# Patient Record
Sex: MALE | Race: WHITE | NOT HISPANIC OR LATINO
[De-identification: names, ages, dates, MRNs, and addresses within clinical notes are randomized per-mention and may not be internally consistent; named-entity substitution may affect disease eponyms.]

---

## 2020-06-22 ENCOUNTER — APPOINTMENT (OUTPATIENT)
Dept: DISASTER EMERGENCY | Facility: CLINIC | Age: 65
End: 2020-06-22

## 2020-06-22 DIAGNOSIS — Z01.818 ENCOUNTER FOR OTHER PREPROCEDURAL EXAMINATION: ICD-10-CM

## 2020-06-23 LAB — SARS-COV-2 N GENE NPH QL NAA+PROBE: NOT DETECTED

## 2020-07-17 ENCOUNTER — APPOINTMENT (OUTPATIENT)
Dept: DISASTER EMERGENCY | Facility: CLINIC | Age: 65
End: 2020-07-17

## 2020-07-18 LAB — SARS-COV-2 N GENE NPH QL NAA+PROBE: NOT DETECTED

## 2022-04-12 NOTE — H&P ADULT - HISTORY OF PRESENT ILLNESS
67 y.o male with DM, HTN, HLD, h/o fall trauma, chronic atypical chest pain presented to cardiology with c/o recent substernal chest symptoms at rest, non-radiating or  67 y.o male, long history of smoking, with PMHx of DM, HTN, HLD, h/o fall trauma, significant claudication, chronic atypical chest pain presented to cardiology with c/o recent substernal chest symptoms at rest, non-radiating or associated with diaphoresis. In given multiple CAD risk factors, Pt scheduled for cardiac cath for further ischemic evaluation.   COVID-19 PCR (4/9/2022); NotDetect

## 2022-04-12 NOTE — H&P ADULT - ASSESSMENT
67 y.o male, long history of smoking, with PMHx of DM, HTN, HLD, h/o fall trauma, significant claudication, chronic atypical chest pain presented to cardiology with c/o recent substernal chest symptoms at rest, non-radiating or associated with diaphoresis. In given multiple CAD risk factors, Pt scheduled for cardiac cath for further ischemic evaluation.     ASA class:  Cr:  GFR:  Bleeding risk:    Mohran score:  67 y.o male, long history of smoking, with PMHx of DM, HTN, HLD, h/o fall trauma, significant claudication, chronic atypical chest pain presented to cardiology with c/o recent substernal chest symptoms at rest, non-radiating or associated with diaphoresis. In given multiple CAD risk factors, Pt scheduled for cardiac cath for further ischemic evaluation.     ASA class: II  Cr: 1.2  GFR: 66  Bleeding risk: 0.9  Mike score: 4 points 7.5%  NS 250cc bolus ordered and given

## 2022-04-12 NOTE — H&P ADULT - NSHPREVIEWOFSYSTEMS_GEN_ALL_CORE
REVIEW OF SYSTEMS:  CONSTITUTIONAL: No fever, weight loss, or fatigue  EYES: No eye pain, visual disturbances, or discharge  ENMT:  No difficulty hearing, tinnitus, vertigo; No sinus or throat pain  NECK: No pain or stiffness  BREASTS: No pain, masses, or nipple discharge  RESPIRATORY: No cough, wheezing, chills or hemoptysis; No shortness of breath  CARDIOVASCULAR: + chest pain, +diaphoresis   GASTROINTESTINAL: No abdominal or epigastric pain. No nausea, vomiting, or hematemesis; No diarrhea or constipation. No melena or hematochezia.  GENITOURINARY: No dysuria, frequency, hematuria, or incontinence  NEUROLOGICAL: No headaches, memory loss, loss of strength, numbness, or tremors  SKIN: No itching, burning, rashes, or lesions   ENDOCRINE: No heat or cold intolerance; No hair loss  MUSCULOSKELETAL: No joint pain or swelling; No muscle, back, or extremity pain  PSYCHIATRIC: No depression, anxiety, mood swings, or difficulty sleeping

## 2022-04-13 ENCOUNTER — OUTPATIENT (OUTPATIENT)
Dept: OUTPATIENT SERVICES | Facility: HOSPITAL | Age: 67
LOS: 1 days | Discharge: HOME | End: 2022-04-13
Payer: MEDICARE

## 2022-04-13 VITALS
RESPIRATION RATE: 16 BRPM | DIASTOLIC BLOOD PRESSURE: 73 MMHG | SYSTOLIC BLOOD PRESSURE: 108 MMHG | HEART RATE: 58 BPM | OXYGEN SATURATION: 99 %

## 2022-04-13 VITALS
DIASTOLIC BLOOD PRESSURE: 75 MMHG | HEIGHT: 70.4 IN | TEMPERATURE: 97 F | SYSTOLIC BLOOD PRESSURE: 178 MMHG | OXYGEN SATURATION: 100 % | RESPIRATION RATE: 16 BRPM | WEIGHT: 190.48 LBS | HEART RATE: 56 BPM

## 2022-04-13 DIAGNOSIS — R94.31 ABNORMAL ELECTROCARDIOGRAM [ECG] [EKG]: ICD-10-CM

## 2022-04-13 DIAGNOSIS — R07.9 CHEST PAIN, UNSPECIFIED: ICD-10-CM

## 2022-04-13 PROCEDURE — 93458 L HRT ARTERY/VENTRICLE ANGIO: CPT

## 2022-04-13 PROCEDURE — C1887: CPT

## 2022-04-13 PROCEDURE — C1894: CPT

## 2022-04-13 PROCEDURE — C1769: CPT

## 2022-04-13 RX ORDER — SITAGLIPTIN AND METFORMIN HYDROCHLORIDE 500; 50 MG/1; MG/1
1 TABLET, FILM COATED ORAL
Qty: 0 | Refills: 0 | DISCHARGE

## 2022-04-13 RX ORDER — SODIUM CHLORIDE 9 MG/ML
1000 INJECTION INTRAMUSCULAR; INTRAVENOUS; SUBCUTANEOUS
Refills: 0 | Status: DISCONTINUED | OUTPATIENT
Start: 2022-04-13 | End: 2022-04-13

## 2022-04-13 RX ORDER — SODIUM CHLORIDE 9 MG/ML
250 INJECTION INTRAMUSCULAR; INTRAVENOUS; SUBCUTANEOUS ONCE
Refills: 0 | Status: COMPLETED | OUTPATIENT
Start: 2022-04-13 | End: 2022-04-13

## 2022-04-13 RX ADMIN — SODIUM CHLORIDE 500 MILLILITER(S): 9 INJECTION INTRAMUSCULAR; INTRAVENOUS; SUBCUTANEOUS at 08:30

## 2022-04-13 NOTE — CHART NOTE - NSCHARTNOTEFT_GEN_A_CORE
Nurse Practitioner Progress note:   HPI:  67 y.o male, long history of smoking, with PMHx of DM, HTN, HLD, h/o fall trauma, significant claudication, chronic atypical chest pain presented to cardiology with c/o recent substernal chest symptoms at rest, non-radiating or associated with diaphoresis. In given multiple CAD risk factors, Pt scheduled for cardiac cath for further ischemic evaluation.     T(C): 36 (04-13-22 @ 07:09), Max: 36 (04-13-22 @ 07:09)  HR: 58 (04-13-22 @ 09:05) (56 - 59)  BP: 112/62 (04-13-22 @ 09:05) (112/62 - 178/75)  RR: 16 (04-13-22 @ 09:05) (16 - 16)  SpO2: 97% (04-13-22 @ 09:05) (96% - 100%)  Wt(kg): --    PHYSICAL EXAM:  Neurologic: Non-focal, AxOx3.  No neuro deficits  Vascular: Peripheral pulses palpable 2+ bilaterally  Procedure Site: Rt. radial band removed by RN manual pressure applied x20 minutes site benign soft no bleeding no hematoma +1 radial pulse pressure dressing with gauze applied to site no c/o numbness/tingling,< 3sec cap refill fingers/hand warm to touch     PROCEDURE RESULTS:  S/P C non-obstructive CAD        ASSESSMENT/PLAN: 	  -VS, labs, diet, activity as per post cath orders  -IV hydration NS 250cc /hr x4 hours   -Encourage PO fluids  -Continue current medications  -Pt. to be discharged home today  -Plan of care D/W pt. and MD  -Post cath instructions reviewed with pt., pt. verbalizes and understands instructions  -Follow-up with attending

## 2022-04-15 DIAGNOSIS — R94.39 ABNORMAL RESULT OF OTHER CARDIOVASCULAR FUNCTION STUDY: ICD-10-CM

## 2022-04-15 DIAGNOSIS — R07.9 CHEST PAIN, UNSPECIFIED: ICD-10-CM

## 2023-08-10 PROBLEM — E78.5 HYPERLIPIDEMIA, UNSPECIFIED: Chronic | Status: ACTIVE | Noted: 2022-04-12

## 2023-08-10 PROBLEM — I10 ESSENTIAL (PRIMARY) HYPERTENSION: Chronic | Status: ACTIVE | Noted: 2022-04-12

## 2023-08-17 ENCOUNTER — APPOINTMENT (OUTPATIENT)
Dept: ORTHOPEDIC SURGERY | Facility: CLINIC | Age: 68
End: 2023-08-17
Payer: MEDICARE

## 2023-08-17 VITALS — WEIGHT: 190 LBS | BODY MASS INDEX: 27.2 KG/M2 | HEIGHT: 70 IN

## 2023-08-17 DIAGNOSIS — Z00.00 ENCOUNTER FOR GENERAL ADULT MEDICAL EXAMINATION W/OUT ABNORMAL FINDINGS: ICD-10-CM

## 2023-08-17 DIAGNOSIS — E78.00 PURE HYPERCHOLESTEROLEMIA, UNSPECIFIED: ICD-10-CM

## 2023-08-17 DIAGNOSIS — E11.9 TYPE 2 DIABETES MELLITUS W/OUT COMPLICATIONS: ICD-10-CM

## 2023-08-17 DIAGNOSIS — Z86.69 PERSONAL HISTORY OF OTHER DISEASES OF THE NERVOUS SYSTEM AND SENSE ORGANS: ICD-10-CM

## 2023-08-17 DIAGNOSIS — M19.90 UNSPECIFIED OSTEOARTHRITIS, UNSPECIFIED SITE: ICD-10-CM

## 2023-08-17 DIAGNOSIS — I10 ESSENTIAL (PRIMARY) HYPERTENSION: ICD-10-CM

## 2023-08-17 PROCEDURE — 99204 OFFICE O/P NEW MOD 45 MIN: CPT

## 2023-08-17 PROCEDURE — 73630 X-RAY EXAM OF FOOT: CPT | Mod: 50

## 2023-08-17 NOTE — ASSESSMENT
[FreeTextEntry1] : MRI right hindfoot with and without contrast is recommended to evaluate the calcaneal lesion. Blood work ordered to evaluate for signs of a chronic infection. Patient is recommended to see a neurologist and be formally evaluated for neuropathy. The intermittent burning pain in both feet is likely related to neuropathy and not the right calcaneal lesion.

## 2023-08-17 NOTE — PHYSICAL EXAM
[NL (40)] : plantar flexion 40 degrees [NL 30)] : inversion 30 degrees [NL (20)] : eversion 20 degrees [5___] : [facility] 5[unfilled]/5 [2+] : posterior tibialis pulse: 2+ [Normal] : saphenous nerve sensation normal [] : patient ambulates without assistive device [Left] : left foot [There are no fractures, subluxations or dislocations. No significant abnormalities are seen] : There are no fractures, subluxations or dislocations. No significant abnormalities are seen [Right] : right foot [Weight -] : weightbearing [de-identified] : Lytic lesion in the anterior process of the calcaneus with heterogenous center.  Hammer toes. [TWNoteComboBox7] : dorsiflexion 15 degrees

## 2023-08-17 NOTE — HISTORY OF PRESENT ILLNESS
[8] : 8 [Sharp] : sharp [Shooting] : shooting [de-identified] : Pt is a 68-year-old male who presents today for evaluation of his feet.  He reports burning pain in both feet starting in early 2023.  Had XR taken at his podiatrist office which showed a lesion in the heel.  Symptoms are intermittent. Denies trauma/previous injury. Hx of neuropathy, but has never had an EMG/NCV or has been evaluated by a neurologist.  WB in work shoes. No treatment to date. [FreeTextEntry1] : b/l feet

## 2023-08-21 LAB
B BURGDOR AB SER-IMP: NEGATIVE
B BURGDOR IGG+IGM SER QL: 0.07 INDEX
CRP SERPL-MCNC: <3 MG/L
ERYTHROCYTE [SEDIMENTATION RATE] IN BLOOD BY WESTERGREN METHOD: 9 MM/HR

## 2023-08-25 ENCOUNTER — RESULT REVIEW (OUTPATIENT)
Age: 68
End: 2023-08-25

## 2023-08-31 ENCOUNTER — APPOINTMENT (OUTPATIENT)
Dept: ORTHOPEDIC SURGERY | Facility: CLINIC | Age: 68
End: 2023-08-31
Payer: MEDICARE

## 2023-08-31 DIAGNOSIS — M89.9 DISORDER OF BONE, UNSPECIFIED: ICD-10-CM

## 2023-08-31 PROCEDURE — 99213 OFFICE O/P EST LOW 20 MIN: CPT

## 2023-08-31 NOTE — PHYSICAL EXAM
[NL (40)] : plantar flexion 40 degrees [NL 30)] : inversion 30 degrees [NL (20)] : eversion 20 degrees [5___] : Count includes the [facility] 5[unfilled]/5 [2+] : posterior tibialis pulse: 2+ [Normal] : saphenous nerve sensation normal [Left] : left foot [There are no fractures, subluxations or dislocations. No significant abnormalities are seen] : There are no fractures, subluxations or dislocations. No significant abnormalities are seen [Right] : right foot [Weight -] : weightbearing [] : non-antalgic [de-identified] :  Hammer toes. [TWNoteComboBox7] : dorsiflexion 15 degrees

## 2023-08-31 NOTE — ASSESSMENT
[FreeTextEntry1] : CT report and films reviewed.  WB in supportive footwear. If any increased pain or swelling in RT foot, return to office sooner than 3 month follow up.  The intermittent burning pain in both feet is likely related to neuropathy and not the right calcaneal lesion. He will follow up with his internist in regards to the neuropathy (internist aware)  Repeat x-ray will be performed at the next office visit (RT foot and RT calcaneus).

## 2023-08-31 NOTE — HISTORY OF PRESENT ILLNESS
[8] : 8 [Sharp] : sharp [Shooting] : shooting [de-identified] : Pt is a 68-year-old male who presents today for evaluation of his feet.  He reports burning pain in both feet starting in early 2023.  Had XR taken at his podiatrist office which showed a lesion in the heel. Symptoms are intermittent. Denies trauma/previous injury. Hx of neuropathy. WB in work shoes. Ct consistent with intra-osseous lipoma calcaneus. Blood work for chronic infection, unremarkable.  [FreeTextEntry1] : b/l feet

## 2023-10-24 ENCOUNTER — OUTPATIENT (OUTPATIENT)
Dept: OUTPATIENT SERVICES | Facility: HOSPITAL | Age: 68
LOS: 1 days | Discharge: HOME | End: 2023-10-24
Payer: MEDICARE

## 2023-10-24 ENCOUNTER — APPOINTMENT (OUTPATIENT)
Dept: WOUND CARE | Facility: HOSPITAL | Age: 68
End: 2023-10-24
Payer: MEDICARE

## 2023-10-24 VITALS
DIASTOLIC BLOOD PRESSURE: 73 MMHG | OXYGEN SATURATION: 97 % | SYSTOLIC BLOOD PRESSURE: 174 MMHG | HEART RATE: 67 BPM | RESPIRATION RATE: 18 BRPM | BODY MASS INDEX: 27.2 KG/M2 | TEMPERATURE: 98.1 F | WEIGHT: 190 LBS | HEIGHT: 70 IN

## 2023-10-24 DIAGNOSIS — E11.9 TYPE 2 DIABETES MELLITUS W/OUT COMPLICATIONS: ICD-10-CM

## 2023-10-24 DIAGNOSIS — Z86.16 PERSONAL HISTORY OF COVID-19: ICD-10-CM

## 2023-10-24 DIAGNOSIS — M79.673 PAIN IN UNSPECIFIED FOOT: ICD-10-CM

## 2023-10-24 DIAGNOSIS — Z83.3 FAMILY HISTORY OF DIABETES MELLITUS: ICD-10-CM

## 2023-10-24 DIAGNOSIS — M79.671 PAIN IN RIGHT FOOT: ICD-10-CM

## 2023-10-24 DIAGNOSIS — Z87.891 PERSONAL HISTORY OF NICOTINE DEPENDENCE: ICD-10-CM

## 2023-10-24 DIAGNOSIS — M79.672 PAIN IN RIGHT FOOT: ICD-10-CM

## 2023-10-24 DIAGNOSIS — S91.309A UNSPECIFIED OPEN WOUND, UNSPECIFIED FOOT, INITIAL ENCOUNTER: ICD-10-CM

## 2023-10-24 DIAGNOSIS — Z78.9 OTHER SPECIFIED HEALTH STATUS: ICD-10-CM

## 2023-10-24 PROCEDURE — G0463: CPT

## 2023-10-24 PROCEDURE — 73630 X-RAY EXAM OF FOOT: CPT

## 2023-10-24 PROCEDURE — 73630 X-RAY EXAM OF FOOT: CPT | Mod: 26,RT

## 2023-10-24 PROCEDURE — 99203 OFFICE O/P NEW LOW 30 MIN: CPT

## 2023-10-24 RX ORDER — HYDROCHLOROTHIAZIDE 12.5 MG/1
12.5 TABLET ORAL
Refills: 0 | Status: DISCONTINUED | COMMUNITY

## 2023-10-24 RX ORDER — SITAGLIPTIN AND METFORMIN HYDROCHLORIDE 50; 1000 MG/1; MG/1
50-1000 TABLET, FILM COATED ORAL
Refills: 0 | Status: DISCONTINUED | COMMUNITY

## 2023-10-24 RX ORDER — METOPROLOL SUCCINATE 50 MG/1
50 TABLET, EXTENDED RELEASE ORAL
Refills: 0 | Status: DISCONTINUED | COMMUNITY

## 2023-10-26 DIAGNOSIS — Z88.1 ALLERGY STATUS TO OTHER ANTIBIOTIC AGENTS STATUS: ICD-10-CM

## 2023-10-26 DIAGNOSIS — Z91.048 OTHER NONMEDICINAL SUBSTANCE ALLERGY STATUS: ICD-10-CM

## 2023-10-26 DIAGNOSIS — E78.00 PURE HYPERCHOLESTEROLEMIA, UNSPECIFIED: ICD-10-CM

## 2023-10-26 DIAGNOSIS — Z86.16 PERSONAL HISTORY OF COVID-19: ICD-10-CM

## 2023-10-26 DIAGNOSIS — M85.671 OTHER CYST OF BONE, RIGHT ANKLE AND FOOT: ICD-10-CM

## 2023-10-26 DIAGNOSIS — E11.40 TYPE 2 DIABETES MELLITUS WITH DIABETIC NEUROPATHY, UNSPECIFIED: ICD-10-CM

## 2023-10-26 DIAGNOSIS — Z87.891 PERSONAL HISTORY OF NICOTINE DEPENDENCE: ICD-10-CM

## 2023-10-26 DIAGNOSIS — Z79.84 LONG TERM (CURRENT) USE OF ORAL HYPOGLYCEMIC DRUGS: ICD-10-CM

## 2023-10-26 DIAGNOSIS — I10 ESSENTIAL (PRIMARY) HYPERTENSION: ICD-10-CM

## 2023-10-26 DIAGNOSIS — M19.90 UNSPECIFIED OSTEOARTHRITIS, UNSPECIFIED SITE: ICD-10-CM

## 2023-10-26 DIAGNOSIS — Z83.3 FAMILY HISTORY OF DIABETES MELLITUS: ICD-10-CM

## 2023-10-30 ENCOUNTER — TRANSCRIPTION ENCOUNTER (OUTPATIENT)
Age: 68
End: 2023-10-30

## 2023-10-30 ENCOUNTER — INPATIENT (INPATIENT)
Facility: HOSPITAL | Age: 68
LOS: 2 days | Discharge: HOME | DRG: 505 | End: 2023-11-02
Attending: INTERNAL MEDICINE | Admitting: INTERNAL MEDICINE
Payer: MEDICARE

## 2023-10-30 VITALS
OXYGEN SATURATION: 98 % | WEIGHT: 184.97 LBS | HEART RATE: 62 BPM | TEMPERATURE: 98 F | SYSTOLIC BLOOD PRESSURE: 198 MMHG | DIASTOLIC BLOOD PRESSURE: 77 MMHG | RESPIRATION RATE: 16 BRPM

## 2023-10-30 DIAGNOSIS — E11.9 TYPE 2 DIABETES MELLITUS WITHOUT COMPLICATIONS: ICD-10-CM

## 2023-10-30 DIAGNOSIS — G62.9 POLYNEUROPATHY, UNSPECIFIED: ICD-10-CM

## 2023-10-30 DIAGNOSIS — F10.20 ALCOHOL DEPENDENCE, UNCOMPLICATED: ICD-10-CM

## 2023-10-30 DIAGNOSIS — D49.89 NEOPLASM OF UNSPECIFIED BEHAVIOR OF OTHER SPECIFIED SITES: ICD-10-CM

## 2023-10-30 DIAGNOSIS — Z29.9 ENCOUNTER FOR PROPHYLACTIC MEASURES, UNSPECIFIED: ICD-10-CM

## 2023-10-30 DIAGNOSIS — D49.2 NEOPLASM OF UNSPECIFIED BEHAVIOR OF BONE, SOFT TISSUE, AND SKIN: ICD-10-CM

## 2023-10-30 DIAGNOSIS — E78.5 HYPERLIPIDEMIA, UNSPECIFIED: ICD-10-CM

## 2023-10-30 DIAGNOSIS — I10 ESSENTIAL (PRIMARY) HYPERTENSION: ICD-10-CM

## 2023-10-30 LAB
A1C WITH ESTIMATED AVERAGE GLUCOSE RESULT: 9.2 % — HIGH (ref 4–5.6)
A1C WITH ESTIMATED AVERAGE GLUCOSE RESULT: 9.2 % — HIGH (ref 4–5.6)
ALBUMIN SERPL ELPH-MCNC: 4.1 G/DL — SIGNIFICANT CHANGE UP (ref 3.3–5)
ALBUMIN SERPL ELPH-MCNC: 4.1 G/DL — SIGNIFICANT CHANGE UP (ref 3.3–5)
ALP SERPL-CCNC: 91 U/L — SIGNIFICANT CHANGE UP (ref 40–120)
ALP SERPL-CCNC: 91 U/L — SIGNIFICANT CHANGE UP (ref 40–120)
ALT FLD-CCNC: 35 U/L — SIGNIFICANT CHANGE UP (ref 12–78)
ALT FLD-CCNC: 35 U/L — SIGNIFICANT CHANGE UP (ref 12–78)
ANION GAP SERPL CALC-SCNC: 8 MMOL/L — SIGNIFICANT CHANGE UP (ref 5–17)
ANION GAP SERPL CALC-SCNC: 8 MMOL/L — SIGNIFICANT CHANGE UP (ref 5–17)
AST SERPL-CCNC: 30 U/L — SIGNIFICANT CHANGE UP (ref 15–37)
AST SERPL-CCNC: 30 U/L — SIGNIFICANT CHANGE UP (ref 15–37)
BASOPHILS # BLD AUTO: 0.02 K/UL — SIGNIFICANT CHANGE UP (ref 0–0.2)
BASOPHILS # BLD AUTO: 0.02 K/UL — SIGNIFICANT CHANGE UP (ref 0–0.2)
BASOPHILS NFR BLD AUTO: 0.4 % — SIGNIFICANT CHANGE UP (ref 0–2)
BASOPHILS NFR BLD AUTO: 0.4 % — SIGNIFICANT CHANGE UP (ref 0–2)
BILIRUB SERPL-MCNC: 0.8 MG/DL — SIGNIFICANT CHANGE UP (ref 0.2–1.2)
BILIRUB SERPL-MCNC: 0.8 MG/DL — SIGNIFICANT CHANGE UP (ref 0.2–1.2)
BUN SERPL-MCNC: 11 MG/DL — SIGNIFICANT CHANGE UP (ref 7–23)
BUN SERPL-MCNC: 11 MG/DL — SIGNIFICANT CHANGE UP (ref 7–23)
CALCIUM SERPL-MCNC: 9.6 MG/DL — SIGNIFICANT CHANGE UP (ref 8.5–10.1)
CALCIUM SERPL-MCNC: 9.6 MG/DL — SIGNIFICANT CHANGE UP (ref 8.5–10.1)
CHLORIDE SERPL-SCNC: 107 MMOL/L — SIGNIFICANT CHANGE UP (ref 96–108)
CHLORIDE SERPL-SCNC: 107 MMOL/L — SIGNIFICANT CHANGE UP (ref 96–108)
CO2 SERPL-SCNC: 27 MMOL/L — SIGNIFICANT CHANGE UP (ref 22–31)
CO2 SERPL-SCNC: 27 MMOL/L — SIGNIFICANT CHANGE UP (ref 22–31)
CREAT SERPL-MCNC: 0.89 MG/DL — SIGNIFICANT CHANGE UP (ref 0.5–1.3)
CREAT SERPL-MCNC: 0.89 MG/DL — SIGNIFICANT CHANGE UP (ref 0.5–1.3)
CRP SERPL-MCNC: 5 MG/L — HIGH
CRP SERPL-MCNC: 5 MG/L — HIGH
EGFR: 94 ML/MIN/1.73M2 — SIGNIFICANT CHANGE UP
EGFR: 94 ML/MIN/1.73M2 — SIGNIFICANT CHANGE UP
EOSINOPHIL # BLD AUTO: 0.14 K/UL — SIGNIFICANT CHANGE UP (ref 0–0.5)
EOSINOPHIL # BLD AUTO: 0.14 K/UL — SIGNIFICANT CHANGE UP (ref 0–0.5)
EOSINOPHIL NFR BLD AUTO: 3.1 % — SIGNIFICANT CHANGE UP (ref 0–6)
EOSINOPHIL NFR BLD AUTO: 3.1 % — SIGNIFICANT CHANGE UP (ref 0–6)
ERYTHROCYTE [SEDIMENTATION RATE] IN BLOOD: 7 MM/HR — SIGNIFICANT CHANGE UP (ref 0–20)
ERYTHROCYTE [SEDIMENTATION RATE] IN BLOOD: 7 MM/HR — SIGNIFICANT CHANGE UP (ref 0–20)
ESTIMATED AVERAGE GLUCOSE: 217 MG/DL — HIGH (ref 68–114)
ESTIMATED AVERAGE GLUCOSE: 217 MG/DL — HIGH (ref 68–114)
GLUCOSE SERPL-MCNC: 120 MG/DL — HIGH (ref 70–99)
GLUCOSE SERPL-MCNC: 120 MG/DL — HIGH (ref 70–99)
HCT VFR BLD CALC: 39.4 % — SIGNIFICANT CHANGE UP (ref 39–50)
HCT VFR BLD CALC: 39.4 % — SIGNIFICANT CHANGE UP (ref 39–50)
HGB BLD-MCNC: 12.8 G/DL — LOW (ref 13–17)
HGB BLD-MCNC: 12.8 G/DL — LOW (ref 13–17)
IMM GRANULOCYTES NFR BLD AUTO: 0.2 % — SIGNIFICANT CHANGE UP (ref 0–0.9)
IMM GRANULOCYTES NFR BLD AUTO: 0.2 % — SIGNIFICANT CHANGE UP (ref 0–0.9)
INR BLD: 0.95 RATIO — SIGNIFICANT CHANGE UP (ref 0.85–1.18)
INR BLD: 0.95 RATIO — SIGNIFICANT CHANGE UP (ref 0.85–1.18)
LYMPHOCYTES # BLD AUTO: 0.76 K/UL — LOW (ref 1–3.3)
LYMPHOCYTES # BLD AUTO: 0.76 K/UL — LOW (ref 1–3.3)
LYMPHOCYTES # BLD AUTO: 16.7 % — SIGNIFICANT CHANGE UP (ref 13–44)
LYMPHOCYTES # BLD AUTO: 16.7 % — SIGNIFICANT CHANGE UP (ref 13–44)
MCHC RBC-ENTMCNC: 28.2 PG — SIGNIFICANT CHANGE UP (ref 27–34)
MCHC RBC-ENTMCNC: 28.2 PG — SIGNIFICANT CHANGE UP (ref 27–34)
MCHC RBC-ENTMCNC: 32.5 GM/DL — SIGNIFICANT CHANGE UP (ref 32–36)
MCHC RBC-ENTMCNC: 32.5 GM/DL — SIGNIFICANT CHANGE UP (ref 32–36)
MCV RBC AUTO: 86.8 FL — SIGNIFICANT CHANGE UP (ref 80–100)
MCV RBC AUTO: 86.8 FL — SIGNIFICANT CHANGE UP (ref 80–100)
MONOCYTES # BLD AUTO: 0.35 K/UL — SIGNIFICANT CHANGE UP (ref 0–0.9)
MONOCYTES # BLD AUTO: 0.35 K/UL — SIGNIFICANT CHANGE UP (ref 0–0.9)
MONOCYTES NFR BLD AUTO: 7.7 % — SIGNIFICANT CHANGE UP (ref 2–14)
MONOCYTES NFR BLD AUTO: 7.7 % — SIGNIFICANT CHANGE UP (ref 2–14)
NEUTROPHILS # BLD AUTO: 3.27 K/UL — SIGNIFICANT CHANGE UP (ref 1.8–7.4)
NEUTROPHILS # BLD AUTO: 3.27 K/UL — SIGNIFICANT CHANGE UP (ref 1.8–7.4)
NEUTROPHILS NFR BLD AUTO: 71.9 % — SIGNIFICANT CHANGE UP (ref 43–77)
NEUTROPHILS NFR BLD AUTO: 71.9 % — SIGNIFICANT CHANGE UP (ref 43–77)
NRBC # BLD: 0 /100 WBCS — SIGNIFICANT CHANGE UP (ref 0–0)
NRBC # BLD: 0 /100 WBCS — SIGNIFICANT CHANGE UP (ref 0–0)
PLATELET # BLD AUTO: 196 K/UL — SIGNIFICANT CHANGE UP (ref 150–400)
PLATELET # BLD AUTO: 196 K/UL — SIGNIFICANT CHANGE UP (ref 150–400)
POTASSIUM SERPL-MCNC: 4 MMOL/L — SIGNIFICANT CHANGE UP (ref 3.5–5.3)
POTASSIUM SERPL-MCNC: 4 MMOL/L — SIGNIFICANT CHANGE UP (ref 3.5–5.3)
POTASSIUM SERPL-SCNC: 4 MMOL/L — SIGNIFICANT CHANGE UP (ref 3.5–5.3)
POTASSIUM SERPL-SCNC: 4 MMOL/L — SIGNIFICANT CHANGE UP (ref 3.5–5.3)
PROT SERPL-MCNC: 7.5 G/DL — SIGNIFICANT CHANGE UP (ref 6–8.3)
PROT SERPL-MCNC: 7.5 G/DL — SIGNIFICANT CHANGE UP (ref 6–8.3)
PROTHROM AB SERPL-ACNC: 11.1 SEC — SIGNIFICANT CHANGE UP (ref 9.5–13)
PROTHROM AB SERPL-ACNC: 11.1 SEC — SIGNIFICANT CHANGE UP (ref 9.5–13)
RBC # BLD: 4.54 M/UL — SIGNIFICANT CHANGE UP (ref 4.2–5.8)
RBC # BLD: 4.54 M/UL — SIGNIFICANT CHANGE UP (ref 4.2–5.8)
RBC # FLD: 13.9 % — SIGNIFICANT CHANGE UP (ref 10.3–14.5)
RBC # FLD: 13.9 % — SIGNIFICANT CHANGE UP (ref 10.3–14.5)
SODIUM SERPL-SCNC: 142 MMOL/L — SIGNIFICANT CHANGE UP (ref 135–145)
SODIUM SERPL-SCNC: 142 MMOL/L — SIGNIFICANT CHANGE UP (ref 135–145)
WBC # BLD: 4.55 K/UL — SIGNIFICANT CHANGE UP (ref 3.8–10.5)
WBC # BLD: 4.55 K/UL — SIGNIFICANT CHANGE UP (ref 3.8–10.5)
WBC # FLD AUTO: 4.55 K/UL — SIGNIFICANT CHANGE UP (ref 3.8–10.5)
WBC # FLD AUTO: 4.55 K/UL — SIGNIFICANT CHANGE UP (ref 3.8–10.5)

## 2023-10-30 PROCEDURE — 93010 ELECTROCARDIOGRAM REPORT: CPT

## 2023-10-30 PROCEDURE — 73701 CT LOWER EXTREMITY W/DYE: CPT | Mod: 26,RT,MA

## 2023-10-30 PROCEDURE — 71045 X-RAY EXAM CHEST 1 VIEW: CPT | Mod: 26

## 2023-10-30 PROCEDURE — 73630 X-RAY EXAM OF FOOT: CPT | Mod: 26,RT

## 2023-10-30 PROCEDURE — 99285 EMERGENCY DEPT VISIT HI MDM: CPT

## 2023-10-30 RX ORDER — INSULIN LISPRO 100/ML
VIAL (ML) SUBCUTANEOUS
Refills: 0 | Status: DISCONTINUED | OUTPATIENT
Start: 2023-10-30 | End: 2023-11-01

## 2023-10-30 RX ORDER — GLUCAGON INJECTION, SOLUTION 0.5 MG/.1ML
1 INJECTION, SOLUTION SUBCUTANEOUS ONCE
Refills: 0 | Status: DISCONTINUED | OUTPATIENT
Start: 2023-10-30 | End: 2023-11-02

## 2023-10-30 RX ORDER — FOLIC ACID 0.8 MG
1 TABLET ORAL DAILY
Refills: 0 | Status: DISCONTINUED | OUTPATIENT
Start: 2023-10-30 | End: 2023-11-02

## 2023-10-30 RX ORDER — SODIUM CHLORIDE 9 MG/ML
1000 INJECTION, SOLUTION INTRAVENOUS
Refills: 0 | Status: DISCONTINUED | OUTPATIENT
Start: 2023-10-30 | End: 2023-11-02

## 2023-10-30 RX ORDER — ATORVASTATIN CALCIUM 80 MG/1
80 TABLET, FILM COATED ORAL AT BEDTIME
Refills: 0 | Status: DISCONTINUED | OUTPATIENT
Start: 2023-10-30 | End: 2023-11-02

## 2023-10-30 RX ORDER — ENOXAPARIN SODIUM 100 MG/ML
40 INJECTION SUBCUTANEOUS ONCE
Refills: 0 | Status: DISCONTINUED | OUTPATIENT
Start: 2023-10-30 | End: 2023-10-30

## 2023-10-30 RX ORDER — SODIUM CHLORIDE 9 MG/ML
1000 INJECTION INTRAMUSCULAR; INTRAVENOUS; SUBCUTANEOUS
Refills: 0 | Status: DISCONTINUED | OUTPATIENT
Start: 2023-10-30 | End: 2023-10-31

## 2023-10-30 RX ORDER — MORPHINE SULFATE 50 MG/1
2 CAPSULE, EXTENDED RELEASE ORAL EVERY 6 HOURS
Refills: 0 | Status: DISCONTINUED | OUTPATIENT
Start: 2023-10-30 | End: 2023-11-02

## 2023-10-30 RX ORDER — THIAMINE MONONITRATE (VIT B1) 100 MG
100 TABLET ORAL DAILY
Refills: 0 | Status: DISCONTINUED | OUTPATIENT
Start: 2023-10-30 | End: 2023-11-02

## 2023-10-30 RX ORDER — TRAMADOL HYDROCHLORIDE 50 MG/1
50 TABLET ORAL THREE TIMES A DAY
Refills: 0 | Status: DISCONTINUED | OUTPATIENT
Start: 2023-10-30 | End: 2023-10-31

## 2023-10-30 RX ORDER — DEXTROSE 50 % IN WATER 50 %
25 SYRINGE (ML) INTRAVENOUS ONCE
Refills: 0 | Status: DISCONTINUED | OUTPATIENT
Start: 2023-10-30 | End: 2023-11-02

## 2023-10-30 RX ORDER — METOPROLOL TARTRATE 50 MG
50 TABLET ORAL DAILY
Refills: 0 | Status: DISCONTINUED | OUTPATIENT
Start: 2023-10-30 | End: 2023-11-02

## 2023-10-30 RX ORDER — ASPIRIN/CALCIUM CARB/MAGNESIUM 324 MG
81 TABLET ORAL DAILY
Refills: 0 | Status: DISCONTINUED | OUTPATIENT
Start: 2023-10-30 | End: 2023-10-30

## 2023-10-30 RX ORDER — DEXTROSE 50 % IN WATER 50 %
15 SYRINGE (ML) INTRAVENOUS ONCE
Refills: 0 | Status: DISCONTINUED | OUTPATIENT
Start: 2023-10-30 | End: 2023-11-02

## 2023-10-30 RX ORDER — ONDANSETRON 8 MG/1
4 TABLET, FILM COATED ORAL EVERY 8 HOURS
Refills: 0 | Status: DISCONTINUED | OUTPATIENT
Start: 2023-10-30 | End: 2023-11-02

## 2023-10-30 RX ORDER — PANTOPRAZOLE SODIUM 20 MG/1
40 TABLET, DELAYED RELEASE ORAL
Refills: 0 | Status: DISCONTINUED | OUTPATIENT
Start: 2023-10-30 | End: 2023-11-02

## 2023-10-30 RX ORDER — MAGNESIUM HYDROXIDE 400 MG/1
30 TABLET, CHEWABLE ORAL DAILY
Refills: 0 | Status: DISCONTINUED | OUTPATIENT
Start: 2023-10-30 | End: 2023-11-02

## 2023-10-30 RX ORDER — DEXTROSE 50 % IN WATER 50 %
12.5 SYRINGE (ML) INTRAVENOUS ONCE
Refills: 0 | Status: DISCONTINUED | OUTPATIENT
Start: 2023-10-30 | End: 2023-11-02

## 2023-10-30 RX ORDER — INSULIN LISPRO 100/ML
VIAL (ML) SUBCUTANEOUS AT BEDTIME
Refills: 0 | Status: DISCONTINUED | OUTPATIENT
Start: 2023-10-30 | End: 2023-11-01

## 2023-10-30 RX ORDER — SENNA PLUS 8.6 MG/1
2 TABLET ORAL AT BEDTIME
Refills: 0 | Status: DISCONTINUED | OUTPATIENT
Start: 2023-10-30 | End: 2023-11-02

## 2023-10-30 RX ORDER — ACETAMINOPHEN 500 MG
1000 TABLET ORAL ONCE
Refills: 0 | Status: COMPLETED | OUTPATIENT
Start: 2023-10-30 | End: 2023-10-30

## 2023-10-30 RX ORDER — GABAPENTIN 400 MG/1
300 CAPSULE ORAL
Refills: 0 | Status: DISCONTINUED | OUTPATIENT
Start: 2023-10-30 | End: 2023-11-02

## 2023-10-30 RX ORDER — ACETAMINOPHEN 500 MG
650 TABLET ORAL EVERY 6 HOURS
Refills: 0 | Status: DISCONTINUED | OUTPATIENT
Start: 2023-10-30 | End: 2023-10-31

## 2023-10-30 RX ORDER — LANOLIN ALCOHOL/MO/W.PET/CERES
3 CREAM (GRAM) TOPICAL AT BEDTIME
Refills: 0 | Status: DISCONTINUED | OUTPATIENT
Start: 2023-10-30 | End: 2023-11-02

## 2023-10-30 RX ORDER — LOSARTAN POTASSIUM 100 MG/1
100 TABLET, FILM COATED ORAL DAILY
Refills: 0 | Status: DISCONTINUED | OUTPATIENT
Start: 2023-10-30 | End: 2023-11-02

## 2023-10-30 RX ADMIN — LOSARTAN POTASSIUM 100 MILLIGRAM(S): 100 TABLET, FILM COATED ORAL at 15:47

## 2023-10-30 RX ADMIN — Medication 1000 MILLIGRAM(S): at 10:30

## 2023-10-30 RX ADMIN — Medication 50 MILLIGRAM(S): at 15:48

## 2023-10-30 RX ADMIN — PANTOPRAZOLE SODIUM 40 MILLIGRAM(S): 20 TABLET, DELAYED RELEASE ORAL at 15:48

## 2023-10-30 RX ADMIN — ATORVASTATIN CALCIUM 80 MILLIGRAM(S): 80 TABLET, FILM COATED ORAL at 21:35

## 2023-10-30 RX ADMIN — Medication 81 MILLIGRAM(S): at 15:00

## 2023-10-30 RX ADMIN — Medication 400 MILLIGRAM(S): at 09:44

## 2023-10-30 RX ADMIN — GABAPENTIN 300 MILLIGRAM(S): 400 CAPSULE ORAL at 19:43

## 2023-10-30 NOTE — H&P ADULT - NSHPADDITIONALINFOADULT_GEN_ALL_CORE
Medically optimized for OR- podiatry surgery , R foot neoplasm excision and cleared by cardiologist .

## 2023-10-30 NOTE — H&P ADULT - PROBLEM SELECTOR PLAN 4
thiamine  folic acid  ciwa  counseling  education  AA referral  SBIRT documentation   Ativan PRN as per CIWA - PO and IV

## 2023-10-30 NOTE — PATIENT PROFILE ADULT - TOBACCO USE
Attempted to contact patient by phone on three separate occasions and left messages with each attempt. Patient did not return calls. Will close case at this time but would be happy to work with patient in the future if needs arise.    
Former smoker

## 2023-10-30 NOTE — H&P ADULT - ASSESSMENT
67 yo M PMHx HTN, HLD, DM, GERD sent to ED by Dr. 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 Pt c/o chronic heel pain x weeks, otherwise feeling ok . Pt denies fever/chills, numbness/tingling. Surgery planned for tomorrow and card clearance requested Addiction medicine cons called due to ETOH addiction hx ( patient drinks 5 beers a day , quit smoking 12 yrs ago ,patient  used to drink and smoke much more as per wife )

## 2023-10-30 NOTE — PATIENT PROFILE ADULT - NSPROHARDOFHEAROTHER_GEN_ALL_CORE
Called and spoke with pharmacist Katherine as [facility] [address] in [place], [place]. They do have the medication Percocet in stock. There was a glitch in the system. Called and spoke with patient. Informed patient of information above. Patient would like the Percocet sent to that [facility]. Please advise.   
Rx for Percocet cancelled at [facility].   
talk loud

## 2023-10-30 NOTE — H&P ADULT - NSICDXPASTMEDICALHX_GEN_ALL_CORE_FT
PAST MEDICAL HISTORY:  DM (diabetes mellitus)     GERD (gastroesophageal reflux disease)     HLD (hyperlipidemia)     HTN (hypertension)     Neuropathy

## 2023-10-30 NOTE — H&P ADULT - NSHPLABSRESULTS_GEN_ALL_CORE
< from: CT Foot w/ IV Cont, Right (10.30.23 @ 10:26) >    ACC: [id number] EXAM:  CT FOOT ONLY IC RT   ORDERED BY: KAYA CARVER     PROCEDURE DATE:  10/30/2023          INTERPRETATION:  CT OF THE RIGHT FOOT    CLINICAL INFORMATION: For evaluation of calcaneal lesion    COMPARISON: Radiographs performed on same date and 10/24/2023. No prior   CT available.    TECHNIQUE: Axial CT images were obtained of the right foot with coronal   and sagittal reconstructions. 3-D volume rendered reformats were also   provided. No intravenous contrast was administered.    FINDINGS:    Osseous: No acute displaced fracture or dislocation. Pes cavus, otherwise   incompletely evaluated on this nonweightbearing examination. Two small   well-corticated heterotopic ossifications versus chronic nonunited   cortical avulsion fragments at the tip of the medial malleolus. Bipartite   versus chronic nonunited transverse fracture of the fibular sided hallux   sesamoid.    Gross fat attenuation ovoid intramedullary lesion within the calcaneal   body measuring approximately3.0 x 3.2 x 2.0 cm in maximal AP,   transverse, craniocaudal dimensions with well-circumscribed lobulated   sclerotic margins and central dystrophic calcification. No cortical   erosion or aggressive periosteal reaction.    Soft tissues: No sizable hyperattenuating hematoma or drainable   encapsulated fluid collection. No significant tibiotalar effusion.   Thickened distal Achilles tendon without discrete tear. Heterogeneous   chronic atrophy of the intrinsic hindfoot musculature    IMPRESSION:    Benign right calcaneal intraosseous lipoma, Milgram stage II.    < end of copied text >

## 2023-10-30 NOTE — PATIENT PROFILE ADULT - FALL HARM RISK - RISK INTERVENTIONS

## 2023-10-30 NOTE — ED ADULT NURSE NOTE - OBJECTIVE STATEMENT
received pt sent for further eval of mass/tumor to R foot.  Pt states he has been having mild pain to foot and f/u with outpt.   Pt daughter at bedside reports from wound care.  No lesions/broken skin noted.   Pt denies fevers/cp/sob/palpitations.   Pt calm, +pedal pulses previously assessed by ed provider. BN

## 2023-10-30 NOTE — ED PROVIDER NOTE - CLINICAL SUMMARY MEDICAL DECISION MAKING FREE TEXT BOX
69 yo M PMH HTN, HLD sent by Dr. Regan wound care for excision of neoplastic lesion of R foot/wound to heel.  vss  well appearing, no fnd    will get labs, xr, ct, analgesia, reassess  tba

## 2023-10-30 NOTE — CONSULT NOTE ADULT - SUBJECTIVE AND OBJECTIVE BOX
History of Present Illness: The patient is a 68 year old male with a history of HTN, HL, DM who was sent in for foot surgery. He has had chronic heel pain and needs surgery. He denies any recent chest pain or shortness of breath. No exertional symptoms.    Past Medical/Surgical History:  HTN, HL, DM     Medications:  Home Medications:  Aspir 81 oral delayed release tablet: 1 tab(s) orally once a day (13 Apr 2022 07:40)  atorvastatin 80 mg oral tablet: 1 tab(s) orally once a day (13 Apr 2022 07:40)  empagliflozin 25 mg oral tablet: 1 tab(s) orally once a day (in the morning) (13 Apr 2022 07:40)  famotidine 20 mg oral tablet: 20 milligram(s) orally once a day (at bedtime) (13 Apr 2022 07:40)  gabapentin 300 mg oral capsule: 300 milligram(s) orally 2 times a day (13 Apr 2022 07:40)  glimepiride 4 mg oral tablet: 1 tab(s) orally once a day (13 Apr 2022 07:40)  hydroCHLOROthiazide 12.5 mg oral tablet: 1 tab(s) orally once a day (13 Apr 2022 07:40)  Levemir FlexTouch 100 units/mL subcutaneous solution: 34 unit(s) subcutaneous (13 Apr 2022 07:40)  losartan 100 mg oral tablet: 1 tab(s) orally once a day (13 Apr 2022 07:40)  Metoprolol Succinate ER 50 mg oral tablet, extended release: 1 tab(s) orally once a day (13 Apr 2022 07:40)  omeprazole 20 mg oral delayed release capsule: 1 cap(s) orally once a day (13 Apr 2022 07:40)  sitagliptin-metformin 50 mg-1000 mg oral tablet: 1 tab(s) orally 2 times a day  Resume on 4/16/22 (13 Apr 2022 09:08)      Family History: Non-contributory family history of premature cardiovascular atherosclerotic disease    Social History: No tobacco, alcohol or drug use    Review of Systems:  General: No fevers, chills, weight gain  Skin: No rashes, color changes  Cardiovascular: No chest pain, orthopnea  Respiratory: No shortness of breath, cough  Gastrointestinal: No nausea, abdominal pain  Genitourinary: No incontinence, pain with urination  Musculoskeletal: No pain, swelling, decreased range of motion  Neurological: No headache, weakness  Psychiatric: No depression, anxiety  Endocrine: No weight gain, increased thirst  All other systems are comprehensively negative.    Physical Exam:  Vitals:        Vital Signs Last 24 Hrs  T(C): 36.8 (30 Oct 2023 13:57), Max: 36.8 (30 Oct 2023 13:57)  T(F): 98.2 (30 Oct 2023 13:57), Max: 98.2 (30 Oct 2023 13:57)  HR: 65 (30 Oct 2023 13:57) (59 - 65)  BP: 155/68 (30 Oct 2023 13:57) (155/68 - 198/77)  BP(mean): --  RR: 18 (30 Oct 2023 13:57) (16 - 18)  SpO2: 97% (30 Oct 2023 13:57) (94% - 98%)    Parameters below as of 30 Oct 2023 13:57  Patient On (Oxygen Delivery Method): room air      General: NAD  HEENT: MMM  Neck: No JVD, no carotid bruit  Lungs: CTAB  CV: RRR, nl S1/S2, no M/R/G  Abdomen: S/NT/ND, +BS  Extremities: No LE edema, no cyanosis  Neuro: AAOx3, non-focal  Skin: No rash    Labs:                        12.8   4.55  )-----------( 196      ( 30 Oct 2023 09:35 )             39.4     10-30    142  |  107  |  11  ----------------------------<  120<H>  4.0   |  27  |  0.89    Ca    9.6      30 Oct 2023 09:35    TPro  7.5  /  Alb  4.1  /  TBili  0.8  /  DBili  x   /  AST  30  /  ALT  35  /  AlkPhos  91  10-30        PT/INR - ( 30 Oct 2023 12:50 )   PT: 11.1 sec;   INR: 0.95 ratio             ECG/Telemetry: Sinus bradycardia, nonspecific ST abnormality

## 2023-10-30 NOTE — H&P ADULT - HISTORY OF PRESENT ILLNESS
69 yo M PMHx HTN, HLD, DM, GERD sent to ED by Dr. 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 Pt c/o chronic heel pain x weeks, otherwise feeling ok . Pt denies fever/chills, numbness/tingling. Surgery planned for tomorrow and card clearance requested Addiction medicine cons called due to ETOH addiction hx ( patient drinks 5 beers a day , quit smoking 12 yrs ago ,patient  used to drink and smoke much more as per wife )

## 2023-10-30 NOTE — H&P ADULT - PROBLEM SELECTOR PLAN 1
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 Scheduled for surgery-lipoma excision  in am ,case d/w Dr Blue , patient and wife at bedside

## 2023-10-30 NOTE — ED PROVIDER NOTE - NS ED ATTENDING STATEMENT MOD
I have seen and examined this patient and fully participated in the care of this patient as the teaching attending.  The service was shared with the ALTA.  I reviewed and verified the documentation and independently performed the documented:

## 2023-10-30 NOTE — CONSULT NOTE ADULT - SUBJECTIVE AND OBJECTIVE BOX
Patient is a 68y old  Male who presents with a chief complaint of right heel pain (30 Oct 2023 12:02)      HPI:  69 yo M PMHx HTN, HLD, DM, GERD sent to ED by Dr. 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 Pt c/o chronic heel pain x weeks, otherwise feeling ok . Pt denies fever/chills, numbness/tingling. Surgery planned for tomorrow and card clearance requested Addiction medicine cons called due to ETOH addiction hx ( patient drinks 5 beers a day , quit smoking 12 yrs ago ,patient  used to drink and smoke much more as per wife ) (30 Oct 2023 12:02)      Asked to see patient for ID Consult    PAST MEDICAL & SURGICAL HISTORY:  HTN (hypertension)      HLD (hyperlipidemia)      DM (diabetes mellitus)      Neuropathy      GERD (gastroesophageal reflux disease)          Allergies    Naprosyn (Hives; Rash)    Intolerances        REVIEW OF SYSTEMS:  All systems below were reviewed and are negative [  ]  HEENT:  ID:  Pulmonary:  Cardiac:  GI:  Renal:  Musculoskeletal:  All other systems above were reviewed and are negative   [  ]    HOME MEDICATIONS:    MEDICATIONS  (STANDING):  atorvastatin 80 milliGRAM(s) Oral at bedtime  dextrose 5%. 1000 milliLiter(s) (50 mL/Hr) IV Continuous <Continuous>  dextrose 5%. 1000 milliLiter(s) (100 mL/Hr) IV Continuous <Continuous>  dextrose 50% Injectable 25 Gram(s) IV Push once  dextrose 50% Injectable 25 Gram(s) IV Push once  dextrose 50% Injectable 12.5 Gram(s) IV Push once  folic acid 1 milliGRAM(s) Oral daily  gabapentin 300 milliGRAM(s) Oral two times a day  glucagon  Injectable 1 milliGRAM(s) IntraMuscular once  insulin lispro (ADMELOG) corrective regimen sliding scale   SubCutaneous three times a day before meals  insulin lispro (ADMELOG) corrective regimen sliding scale   SubCutaneous at bedtime  losartan 100 milliGRAM(s) Oral daily  metoprolol succinate ER 50 milliGRAM(s) Oral daily  multivitamin 1 Tablet(s) Oral daily  pantoprazole    Tablet 40 milliGRAM(s) Oral before breakfast  senna 2 Tablet(s) Oral at bedtime  sodium chloride 0.9%. 1000 milliLiter(s) (50 mL/Hr) IV Continuous <Continuous>  thiamine 100 milliGRAM(s) Oral daily    MEDICATIONS  (PRN):  acetaminophen     Tablet .. 650 milliGRAM(s) Oral every 6 hours PRN Temp greater or equal to 38C (100.4F), Mild Pain (1 - 3)  aluminum hydroxide/magnesium hydroxide/simethicone Suspension 30 milliLiter(s) Oral every 4 hours PRN Dyspepsia  dextrose Oral Gel 15 Gram(s) Oral once PRN Blood Glucose LESS THAN 70 milliGRAM(s)/deciliter  LORazepam     Tablet 1 milliGRAM(s) Oral every 2 hours PRN for ciwa > 5  LORazepam   Injectable 2 milliGRAM(s) IV Push every 30 minutes PRN for CIWA > 8  magnesium hydroxide Suspension 30 milliLiter(s) Oral daily PRN Constipation  melatonin 3 milliGRAM(s) Oral at bedtime PRN Insomnia  morphine  - Injectable 2 milliGRAM(s) IV Push every 6 hours PRN Severe Pain (7 - 10)  ondansetron Injectable 4 milliGRAM(s) IV Push every 8 hours PRN Nausea and/or Vomiting  traMADol 50 milliGRAM(s) Oral three times a day PRN Moderate Pain (4 - 6)      Vital Signs Last 24 Hrs  T(C): 36.5 (30 Oct 2023 15:45), Max: 36.8 (30 Oct 2023 13:57)  T(F): 97.7 (30 Oct 2023 15:45), Max: 98.2 (30 Oct 2023 13:57)  HR: 66 (30 Oct 2023 15:45) (59 - 66)  BP: 159/102 (30 Oct 2023 15:45) (155/68 - 198/77)  BP(mean): --  RR: 18 (30 Oct 2023 15:45) (16 - 18)  SpO2: 92% (30 Oct 2023 15:45) (92% - 98%)    Parameters below as of 30 Oct 2023 15:45  Patient On (Oxygen Delivery Method): room air        PHYSICAL EXAM:  HEENT:  Neck:  Lungs:  Heart:  Abdomen:  Genital/ Rectal:  Extremities:  Neurologic:  Vascular:    I&O's Summary      LABORATORY:                          12.8   4.55  )-----------( 196      ( 30 Oct 2023 09:35 )             39.4       ESR:                   10-30 @ 09:35  7    C-Reactive Protein:     10-30 @ 09:35  5    Procalcitonin:           10-30 @ 09:35   --      10-30    142  |  107  |  11  ----------------------------<  120<H>  4.0   |  27  |  0.89    Ca    9.6      30 Oct 2023 09:35    TPro  7.5  /  Alb  4.1  /  TBili  0.8  /  DBili  x   /  AST  30  /  ALT  35  /  AlkPhos  91  10-30          LABORATORY:    CBC Full  -  ( 30 Oct 2023 09:35 )  WBC Count : 4.55 K/uL  RBC Count : 4.54 M/uL  Hemoglobin : 12.8 g/dL  Hematocrit : 39.4 %  Platelet Count - Automated : 196 K/uL  Mean Cell Volume : 86.8 fl  Mean Cell Hemoglobin : 28.2 pg  Mean Cell Hemoglobin Concentration : 32.5 gm/dL  Auto Neutrophil # : 3.27 K/uL  Auto Lymphocyte # : 0.76 K/uL  Auto Monocyte # : 0.35 K/uL  Auto Eosinophil # : 0.14 K/uL  Auto Basophil # : 0.02 K/uL  Auto Neutrophil % : 71.9 %  Auto Lymphocyte % : 16.7 %  Auto Monocyte % : 7.7 %  Auto Eosinophil % : 3.1 %  Auto Basophil % : 0.4 %        ESR:                   10-30 @ 09:35  7    C-Reactive Protein:     10-30 @ 09:35  5    Procalcitonin:           10-30 @ 09:35   --      10-30    142  |  107  |  11  ----------------------------<  120<H>  4.0   |  27  |  0.89    Ca    9.6      30 Oct 2023 09:35    TPro  7.5  /  Alb  4.1  /  TBili  0.8  /  DBili  x   /  AST  30  /  ALT  35  /  AlkPhos  91  10-30                                                   Patient is a 68y old  Male who presents with a chief complaint of right heel pain (30 Oct 2023 12:02)      HPI:  69 yo M PMHx HTN, HLD, DM, GERD sent to ED by Dr. 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 Pt c/o chronic heel pain x weeks, otherwise feeling ok . Pt denies fever/chills, numbness/tingling. Surgery planned for tomorrow and card clearance requested Addiction medicine cons called due to ETOH addiction hx ( patient drinks 5 beers a day , quit smoking 12 yrs ago ,patient  used to drink and smoke much more as per wife ) (30 Oct 2023 12:02)      Asked to see patient for ID Consult    PAST MEDICAL & SURGICAL HISTORY:  HTN (hypertension)      HLD (hyperlipidemia)      DM (diabetes mellitus)      Neuropathy      GERD (gastroesophageal reflux disease)          Allergies    Naprosyn (Hives; Rash)    Intolerances        REVIEW OF SYSTEMS:  All systems below were reviewed and are negative [  ]  HEENT:  ID:  Pulmonary:  Cardiac:  GI:  Renal:  Musculoskeletal:  All other systems above were reviewed and are negative   [  ]    HOME MEDICATIONS:    MEDICATIONS  (STANDING):  atorvastatin 80 milliGRAM(s) Oral at bedtime  dextrose 5%. 1000 milliLiter(s) (50 mL/Hr) IV Continuous <Continuous>  dextrose 5%. 1000 milliLiter(s) (100 mL/Hr) IV Continuous <Continuous>  dextrose 50% Injectable 25 Gram(s) IV Push once  dextrose 50% Injectable 25 Gram(s) IV Push once  dextrose 50% Injectable 12.5 Gram(s) IV Push once  folic acid 1 milliGRAM(s) Oral daily  gabapentin 300 milliGRAM(s) Oral two times a day  glucagon  Injectable 1 milliGRAM(s) IntraMuscular once  insulin lispro (ADMELOG) corrective regimen sliding scale   SubCutaneous three times a day before meals  insulin lispro (ADMELOG) corrective regimen sliding scale   SubCutaneous at bedtime  losartan 100 milliGRAM(s) Oral daily  metoprolol succinate ER 50 milliGRAM(s) Oral daily  multivitamin 1 Tablet(s) Oral daily  pantoprazole    Tablet 40 milliGRAM(s) Oral before breakfast  senna 2 Tablet(s) Oral at bedtime  sodium chloride 0.9%. 1000 milliLiter(s) (50 mL/Hr) IV Continuous <Continuous>  thiamine 100 milliGRAM(s) Oral daily    MEDICATIONS  (PRN):  acetaminophen     Tablet .. 650 milliGRAM(s) Oral every 6 hours PRN Temp greater or equal to 38C (100.4F), Mild Pain (1 - 3)  aluminum hydroxide/magnesium hydroxide/simethicone Suspension 30 milliLiter(s) Oral every 4 hours PRN Dyspepsia  dextrose Oral Gel 15 Gram(s) Oral once PRN Blood Glucose LESS THAN 70 milliGRAM(s)/deciliter  LORazepam     Tablet 1 milliGRAM(s) Oral every 2 hours PRN for ciwa > 5  LORazepam   Injectable 2 milliGRAM(s) IV Push every 30 minutes PRN for CIWA > 8  magnesium hydroxide Suspension 30 milliLiter(s) Oral daily PRN Constipation  melatonin 3 milliGRAM(s) Oral at bedtime PRN Insomnia  morphine  - Injectable 2 milliGRAM(s) IV Push every 6 hours PRN Severe Pain (7 - 10)  ondansetron Injectable 4 milliGRAM(s) IV Push every 8 hours PRN Nausea and/or Vomiting  traMADol 50 milliGRAM(s) Oral three times a day PRN Moderate Pain (4 - 6)      Vital Signs Last 24 Hrs  T(C): 36.5 (30 Oct 2023 15:45), Max: 36.8 (30 Oct 2023 13:57)  T(F): 97.7 (30 Oct 2023 15:45), Max: 98.2 (30 Oct 2023 13:57)  HR: 66 (30 Oct 2023 15:45) (59 - 66)  BP: 159/102 (30 Oct 2023 15:45) (155/68 - 198/77)  BP(mean): --  RR: 18 (30 Oct 2023 15:45) (16 - 18)  SpO2: 92% (30 Oct 2023 15:45) (92% - 98%)    Parameters below as of 30 Oct 2023 15:45  Patient On (Oxygen Delivery Method): room air        PHYSICAL EXAM:  HEENT:  Neck:  Lungs:  Heart:  Abdomen:  Genital/ Rectal:  Extremities:  Neurologic:  Vascular:    I&O's Summary      LABORATORY:                          12.8   4.55  )-----------( 196      ( 30 Oct 2023 09:35 )             39.4       ESR:                   10-30 @ 09:35  7    C-Reactive Protein:     10-30 @ 09:35  5    Procalcitonin:           10-30 @ 09:35   --      10-30    142  |  107  |  11  ----------------------------<  120<H>  4.0   |  27  |  0.89    Ca    9.6      30 Oct 2023 09:35    TPro  7.5  /  Alb  4.1  /  TBili  0.8  /  DBili  x   /  AST  30  /  ALT  35  /  AlkPhos  91  10-30          LABORATORY:    CBC Full  -  ( 30 Oct 2023 09:35 )  WBC Count : 4.55 K/uL  RBC Count : 4.54 M/uL  Hemoglobin : 12.8 g/dL  Hematocrit : 39.4 %  Platelet Count - Automated : 196 K/uL  Mean Cell Volume : 86.8 fl  Mean Cell Hemoglobin : 28.2 pg  Mean Cell Hemoglobin Concentration : 32.5 gm/dL  Auto Neutrophil # : 3.27 K/uL  Auto Lymphocyte # : 0.76 K/uL  Auto Monocyte # : 0.35 K/uL  Auto Eosinophil # : 0.14 K/uL  Auto Basophil # : 0.02 K/uL  Auto Neutrophil % : 71.9 %  Auto Lymphocyte % : 16.7 %  Auto Monocyte % : 7.7 %  Auto Eosinophil % : 3.1 %  Auto Basophil % : 0.4 %        ESR:                   10-30 @ 09:35  7    C-Reactive Protein:     10-30 @ 09:35  5    Procalcitonin:           10-30 @ 09:35   --      10-30    142  |  107  |  11  ----------------------------<  120<H>  4.0   |  27  |  0.89    Ca    9.6      30 Oct 2023 09:35    TPro  7.5  /  Alb  4.1  /  TBili  0.8  /  DBili  x   /  AST  30  /  ALT  35  /  AlkPhos  91  10-30      Assessment and Plan:    1. R foot lipoma    No need for IV antibiuotics  He can oreop antibiotic prophylaxis a per podiatry    Thank you                                              Patient is a 68y old  Male who presents with a chief complaint of right heel pain (30 Oct 2023 12:02)      The patient is a 68 year old male with a  PMHx HTN, HLD, DM, GERD sent to the  ED by his podiatrist  Dr. Albarran for admission, plan for excision of intraosseus lipoma of right heel. He repotted he had chronic R  heel pain for several weeks. He denied fevers or chills. In the ED he was afebrile. CT of R foot showed a benign intraosseous lipoma.         PAST MEDICAL & SURGICAL HISTORY:  HTN (hypertension)      HLD (hyperlipidemia)      DM (diabetes mellitus)      Neuropathy      GERD (gastroesophageal reflux disease)          Allergies    Naprosyn (Hives; Rash)    Intolerances        REVIEW OF SYSTEMS:  No cough or SOB  No fevers or chills.    HOME MEDICATIONS:    MEDICATIONS  (STANDING):  atorvastatin 80 milliGRAM(s) Oral at bedtime  dextrose 5%. 1000 milliLiter(s) (50 mL/Hr) IV Continuous <Continuous>  dextrose 5%. 1000 milliLiter(s) (100 mL/Hr) IV Continuous <Continuous>  dextrose 50% Injectable 25 Gram(s) IV Push once  dextrose 50% Injectable 25 Gram(s) IV Push once  dextrose 50% Injectable 12.5 Gram(s) IV Push once  folic acid 1 milliGRAM(s) Oral daily  gabapentin 300 milliGRAM(s) Oral two times a day  glucagon  Injectable 1 milliGRAM(s) IntraMuscular once  insulin lispro (ADMELOG) corrective regimen sliding scale   SubCutaneous three times a day before meals  insulin lispro (ADMELOG) corrective regimen sliding scale   SubCutaneous at bedtime  losartan 100 milliGRAM(s) Oral daily  metoprolol succinate ER 50 milliGRAM(s) Oral daily  multivitamin 1 Tablet(s) Oral daily  pantoprazole    Tablet 40 milliGRAM(s) Oral before breakfast  senna 2 Tablet(s) Oral at bedtime  sodium chloride 0.9%. 1000 milliLiter(s) (50 mL/Hr) IV Continuous <Continuous>  thiamine 100 milliGRAM(s) Oral daily    MEDICATIONS  (PRN):  acetaminophen     Tablet .. 650 milliGRAM(s) Oral every 6 hours PRN Temp greater or equal to 38C (100.4F), Mild Pain (1 - 3)  aluminum hydroxide/magnesium hydroxide/simethicone Suspension 30 milliLiter(s) Oral every 4 hours PRN Dyspepsia  dextrose Oral Gel 15 Gram(s) Oral once PRN Blood Glucose LESS THAN 70 milliGRAM(s)/deciliter  LORazepam     Tablet 1 milliGRAM(s) Oral every 2 hours PRN for ciwa > 5  LORazepam   Injectable 2 milliGRAM(s) IV Push every 30 minutes PRN for CIWA > 8  magnesium hydroxide Suspension 30 milliLiter(s) Oral daily PRN Constipation  melatonin 3 milliGRAM(s) Oral at bedtime PRN Insomnia  morphine  - Injectable 2 milliGRAM(s) IV Push every 6 hours PRN Severe Pain (7 - 10)  ondansetron Injectable 4 milliGRAM(s) IV Push every 8 hours PRN Nausea and/or Vomiting  traMADol 50 milliGRAM(s) Oral three times a day PRN Moderate Pain (4 - 6)      Vital Signs Last 24 Hrs  T(C): 36.5 (30 Oct 2023 15:45), Max: 36.8 (30 Oct 2023 13:57)  T(F): 97.7 (30 Oct 2023 15:45), Max: 98.2 (30 Oct 2023 13:57)  HR: 66 (30 Oct 2023 15:45) (59 - 66)  BP: 159/102 (30 Oct 2023 15:45) (155/68 - 198/77)  BP(mean): --  RR: 18 (30 Oct 2023 15:45) (16 - 18)  SpO2: 92% (30 Oct 2023 15:45) (92% - 98%)    Parameters below as of 30 Oct 2023 15:45  Patient On (Oxygen Delivery Method): room air        PHYSICAL EXAM:  HEENT: NC/AT, PERRLA.   Neck: Soft. No masses.   Lungs: Coarse BS bilaterally. No wheezing.   Heart: RRR, no murmurs.   Abdomen: Soft, no tenderness.   Genital/ Rectal: No pride catheter.  Extremities: R heel with no swelling or tenderness.   Neurologic: Awake.       LABORATORY:                          12.8   4.55  )-----------( 196      ( 30 Oct 2023 09:35 )             39.4       ESR:                   10-30 @ 09:35  7    C-Reactive Protein:     10-30 @ 09:35  5    Procalcitonin:           10-30 @ 09:35   --      10-30    142  |  107  |  11  ----------------------------<  120<H>  4.0   |  27  |  0.89    Ca    9.6      30 Oct 2023 09:35    TPro  7.5  /  Alb  4.1  /  TBili  0.8  /  DBili  x   /  AST  30  /  ALT  35  /  AlkPhos  91  10-30      LABORATORY:    CBC Full  -  ( 30 Oct 2023 09:35 )  WBC Count : 4.55 K/uL  RBC Count : 4.54 M/uL  Hemoglobin : 12.8 g/dL  Hematocrit : 39.4 %  Platelet Count - Automated : 196 K/uL  Mean Cell Volume : 86.8 fl  Mean Cell Hemoglobin : 28.2 pg  Mean Cell Hemoglobin Concentration : 32.5 gm/dL  Auto Neutrophil # : 3.27 K/uL  Auto Lymphocyte # : 0.76 K/uL  Auto Monocyte # : 0.35 K/uL  Auto Eosinophil # : 0.14 K/uL  Auto Basophil # : 0.02 K/uL  Auto Neutrophil % : 71.9 %  Auto Lymphocyte % : 16.7 %  Auto Monocyte % : 7.7 %  Auto Eosinophil % : 3.1 %  Auto Basophil % : 0.4 %        ESR:                   10-30 @ 09:35  7    C-Reactive Protein:     10-30 @ 09:35  5    Procalcitonin:           10-30 @ 09:35   --      10-30    142  |  107  |  11  ----------------------------<  120<H>  4.0   |  27  |  0.89    Ca    9.6      30 Oct 2023 09:35    TPro  7.5  /  Alb  4.1  /  TBili  0.8  /  DBili  x   /  AST  30  /  ALT  35  /  AlkPhos  91  10-30      Assessment and Plan:    1. R heel intra-osseous  lipoma    No need for IV antibiotics.   He can get pre-op antibiotic prophylaxis a per podiatry.    Thank you     Dank Salinas MD  [phone].

## 2023-10-30 NOTE — CONSULT NOTE ADULT - SUBJECTIVE AND OBJECTIVE BOX
Date/Time Patient Seen:  		  Referring MD:   Data Reviewed	       Patient is a 68y old  Male who presents with a chief complaint of     Subjective/HPI     · Chief Complaint: The patient is a 68y Male complaining of pain, foot.  · HPI Objective Statement: 69 yo M PMHx HTN, HLD, DM, GERD sent to ED by Dr. 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 Pt c/o chronic heel pain x weeks, otherwise feeling. Pt denies fever/chills, numbness/tingling.    PAST MEDICAL & SURGICAL HISTORY:  HTN (hypertension)    HLD (hyperlipidemia)    DM (diabetes mellitus)    Neuropathy    GERD (gastroesophageal reflux disease)    PAST MEDICAL/SURGICAL/FAMILY/SOCIAL HISTORY:    Past Medical, Past Surgical, and Family History:  PAST MEDICAL HISTORY:  HLD (hyperlipidemia)     HTN (hypertension).     Tobacco Usage:  · Tobacco Usage	Unknown if ever smoked    ALLERGIES AND HOME MEDICATIONS:   Allergies:        Allergies:  	Naprosyn: Drug, Hives, Rash    Home Medications:   * Patient Currently Takes Medications as of 13-Apr-2022 09:08 documented in Structured Notes  · 	Metoprolol Succinate ER 50 mg oral tablet, extended release: 1 tab(s) orally once a day  · 	famotidine 20 mg oral tablet: 20 milligram(s) orally once a day (at bedtime)  · 	Aspir 81 oral delayed release tablet: 1 tab(s) orally once a day  · 	hydroCHLOROthiazide 12.5 mg oral tablet: 1 tab(s) orally once a day  · 	omeprazole 20 mg oral delayed release capsule: 1 cap(s) orally once a day  · 	gabapentin 300 mg oral capsule: 300 milligram(s) orally 2 times a day  · 	losartan 100 mg oral tablet: 1 tab(s) orally once a day  · 	glimepiride 4 mg oral tablet: 1 tab(s) orally once a day  · 	atorvastatin 80 mg oral tablet: 1 tab(s) orally once a day  · 	Levemir FlexTouch 100 units/mL subcutaneous solution: 34 unit(s) subcutaneous  · 	sitagliptin-metformin 50 mg-1000 mg oral tablet: 1 tab(s) orally 2 times a day  	Resume on 4/16/22  · 	empagliflozin 25 mg oral tablet: 1 tab(s) orally once a day (in the morning)    REVIEW OF SYSTEMS:    Review of Systems:  · CONSTITUTIONAL: no fever and no chills.  · MUSCULOSKELETAL: - - -  · Musculoskeletal [+]: R foot pain  · SKIN: - - -  · Skin [+]: wound  · ROS STATEMENT: all other ROS negative except as per HPI        Medication list         MEDICATIONS  (STANDING):  atorvastatin 80 milliGRAM(s) Oral at bedtime  dextrose 5%. 1000 milliLiter(s) (100 mL/Hr) IV Continuous <Continuous>  dextrose 5%. 1000 milliLiter(s) (50 mL/Hr) IV Continuous <Continuous>  dextrose 50% Injectable 25 Gram(s) IV Push once  dextrose 50% Injectable 25 Gram(s) IV Push once  dextrose 50% Injectable 12.5 Gram(s) IV Push once  gabapentin 300 milliGRAM(s) Oral two times a day  glucagon  Injectable 1 milliGRAM(s) IntraMuscular once  insulin lispro (ADMELOG) corrective regimen sliding scale   SubCutaneous three times a day before meals  insulin lispro (ADMELOG) corrective regimen sliding scale   SubCutaneous at bedtime  losartan 100 milliGRAM(s) Oral daily  metoprolol succinate ER 50 milliGRAM(s) Oral daily  pantoprazole    Tablet 40 milliGRAM(s) Oral before breakfast  senna 2 Tablet(s) Oral at bedtime    MEDICATIONS  (PRN):  acetaminophen     Tablet .. 650 milliGRAM(s) Oral every 6 hours PRN Temp greater or equal to 38C (100.4F), Mild Pain (1 - 3)  aluminum hydroxide/magnesium hydroxide/simethicone Suspension 30 milliLiter(s) Oral every 4 hours PRN Dyspepsia  dextrose Oral Gel 15 Gram(s) Oral once PRN Blood Glucose LESS THAN 70 milliGRAM(s)/deciliter  magnesium hydroxide Suspension 30 milliLiter(s) Oral daily PRN Constipation  melatonin 3 milliGRAM(s) Oral at bedtime PRN Insomnia  morphine  - Injectable 2 milliGRAM(s) IV Push every 6 hours PRN Severe Pain (7 - 10)  ondansetron Injectable 4 milliGRAM(s) IV Push every 8 hours PRN Nausea and/or Vomiting  traMADol 50 milliGRAM(s) Oral three times a day PRN Moderate Pain (4 - 6)         Vitals log        ICU Vital Signs Last 24 Hrs  T(C): 36.8 (30 Oct 2023 13:57), Max: 36.8 (30 Oct 2023 13:57)  T(F): 98.2 (30 Oct 2023 13:57), Max: 98.2 (30 Oct 2023 13:57)  HR: 65 (30 Oct 2023 13:57) (59 - 65)  BP: 155/68 (30 Oct 2023 13:57) (155/68 - 198/77)  BP(mean): --  ABP: --  ABP(mean): --  RR: 18 (30 Oct 2023 13:57) (16 - 18)  SpO2: 97% (30 Oct 2023 13:57) (94% - 98%)    O2 Parameters below as of 30 Oct 2023 13:57  Patient On (Oxygen Delivery Method): room air                 Input and Output:  I&O's Detail      Lab Data                        12.8   4.55  )-----------( 196      ( 30 Oct 2023 09:35 )             39.4     10-30    142  |  107  |  11  ----------------------------<  120<H>  4.0   |  27  |  0.89    Ca    9.6      30 Oct 2023 09:35    TPro  7.5  /  Alb  4.1  /  TBili  0.8  /  DBili  x   /  AST  30  /  ALT  35  /  AlkPhos  91  10-30            Review of Systems	  foot neoplasm      Objective     Physical Examination    heart s1s2  lung dc BS  head nc      Pertinent Lab findings & Imaging      Dhaliwal:  NO   Adequate UO     I&O's Detail           Discussed with:     Cultures:	        Radiology    ACC: [id number] EXAM:  CT FOOT ONLY IC RT   ORDERED BY: KAYA CARVER     PROCEDURE DATE:  10/30/2023          INTERPRETATION:  CT OF THE RIGHT FOOT    CLINICAL INFORMATION: For evaluation of calcaneal lesion    COMPARISON: Radiographs performed on same date and 10/24/2023. No prior   CT available.    TECHNIQUE: Axial CT images were obtained of the right foot with coronal   and sagittal reconstructions. 3-D volume rendered reformats were also   provided. No intravenous contrast was administered.    FINDINGS:    Osseous: No acute displaced fracture or dislocation. Pes cavus, otherwise   incompletely evaluated on this nonweightbearing examination. Two small   well-corticated heterotopic ossifications versus chronic nonunited   cortical avulsion fragments at the tip of the medial malleolus. Bipartite   versus chronic nonunited transverse fracture of the fibular sided hallux   sesamoid.    Gross fat attenuation ovoid intramedullary lesion within the calcaneal   body measuring approximately 3.0 x 3.2 x 2.0 cm in maximal AP,   transverse, craniocaudal dimensions with well-circumscribed lobulated   sclerotic margins and central dystrophic calcification. No cortical   erosion or aggressive periosteal reaction.    Soft tissues: No sizable hyperattenuating hematoma or drainable   encapsulated fluid collection. No significant tibiotalar effusion.   Thickened distal Achilles tendon without discrete tear. Heterogeneous   chronic atrophy of the intrinsic hindfoot musculature    IMPRESSION:    Benign right calcaneal intraosseous lipoma, Milgram stage II.    --- End of Report ---            MAYCOL FABIAN MD; Attending Radiologist  This document has been electronically signed. Oct 30 2023 10:43AM

## 2023-10-30 NOTE — ED PROVIDER NOTE - OBJECTIVE STATEMENT
67 yo M PMHx HTN, HLD, DM, GERD sent to ED by Dr. 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 Pt c/o chronic heel pain x weeks, otherwise feeling. Pt denies fever/chills, numbness/tingling.

## 2023-10-31 ENCOUNTER — TRANSCRIPTION ENCOUNTER (OUTPATIENT)
Age: 68
End: 2023-10-31

## 2023-10-31 DIAGNOSIS — E11.9 TYPE 2 DIABETES MELLITUS WITHOUT COMPLICATIONS: ICD-10-CM

## 2023-10-31 LAB
A1C WITH ESTIMATED AVERAGE GLUCOSE RESULT: 9.5 % — HIGH (ref 4–5.6)
A1C WITH ESTIMATED AVERAGE GLUCOSE RESULT: 9.5 % — HIGH (ref 4–5.6)
ALBUMIN SERPL ELPH-MCNC: 3.7 G/DL — SIGNIFICANT CHANGE UP (ref 3.3–5)
ALBUMIN SERPL ELPH-MCNC: 3.7 G/DL — SIGNIFICANT CHANGE UP (ref 3.3–5)
ALP SERPL-CCNC: 82 U/L — SIGNIFICANT CHANGE UP (ref 40–120)
ALP SERPL-CCNC: 82 U/L — SIGNIFICANT CHANGE UP (ref 40–120)
ALT FLD-CCNC: 32 U/L — SIGNIFICANT CHANGE UP (ref 12–78)
ALT FLD-CCNC: 32 U/L — SIGNIFICANT CHANGE UP (ref 12–78)
ANION GAP SERPL CALC-SCNC: 9 MMOL/L — SIGNIFICANT CHANGE UP (ref 5–17)
ANION GAP SERPL CALC-SCNC: 9 MMOL/L — SIGNIFICANT CHANGE UP (ref 5–17)
AST SERPL-CCNC: 24 U/L — SIGNIFICANT CHANGE UP (ref 15–37)
AST SERPL-CCNC: 24 U/L — SIGNIFICANT CHANGE UP (ref 15–37)
BASOPHILS # BLD AUTO: 0.03 K/UL — SIGNIFICANT CHANGE UP (ref 0–0.2)
BASOPHILS # BLD AUTO: 0.03 K/UL — SIGNIFICANT CHANGE UP (ref 0–0.2)
BASOPHILS NFR BLD AUTO: 0.6 % — SIGNIFICANT CHANGE UP (ref 0–2)
BASOPHILS NFR BLD AUTO: 0.6 % — SIGNIFICANT CHANGE UP (ref 0–2)
BILIRUB SERPL-MCNC: 0.9 MG/DL — SIGNIFICANT CHANGE UP (ref 0.2–1.2)
BILIRUB SERPL-MCNC: 0.9 MG/DL — SIGNIFICANT CHANGE UP (ref 0.2–1.2)
BUN SERPL-MCNC: 13 MG/DL — SIGNIFICANT CHANGE UP (ref 7–23)
BUN SERPL-MCNC: 13 MG/DL — SIGNIFICANT CHANGE UP (ref 7–23)
CALCIUM SERPL-MCNC: 9.4 MG/DL — SIGNIFICANT CHANGE UP (ref 8.5–10.1)
CALCIUM SERPL-MCNC: 9.4 MG/DL — SIGNIFICANT CHANGE UP (ref 8.5–10.1)
CHLORIDE SERPL-SCNC: 105 MMOL/L — SIGNIFICANT CHANGE UP (ref 96–108)
CHLORIDE SERPL-SCNC: 105 MMOL/L — SIGNIFICANT CHANGE UP (ref 96–108)
CO2 SERPL-SCNC: 28 MMOL/L — SIGNIFICANT CHANGE UP (ref 22–31)
CO2 SERPL-SCNC: 28 MMOL/L — SIGNIFICANT CHANGE UP (ref 22–31)
CREAT SERPL-MCNC: 0.96 MG/DL — SIGNIFICANT CHANGE UP (ref 0.5–1.3)
CREAT SERPL-MCNC: 0.96 MG/DL — SIGNIFICANT CHANGE UP (ref 0.5–1.3)
EGFR: 87 ML/MIN/1.73M2 — SIGNIFICANT CHANGE UP
EGFR: 87 ML/MIN/1.73M2 — SIGNIFICANT CHANGE UP
EOSINOPHIL # BLD AUTO: 0.2 K/UL — SIGNIFICANT CHANGE UP (ref 0–0.5)
EOSINOPHIL # BLD AUTO: 0.2 K/UL — SIGNIFICANT CHANGE UP (ref 0–0.5)
EOSINOPHIL NFR BLD AUTO: 4.1 % — SIGNIFICANT CHANGE UP (ref 0–6)
EOSINOPHIL NFR BLD AUTO: 4.1 % — SIGNIFICANT CHANGE UP (ref 0–6)
ESTIMATED AVERAGE GLUCOSE: 226 MG/DL — HIGH (ref 68–114)
ESTIMATED AVERAGE GLUCOSE: 226 MG/DL — HIGH (ref 68–114)
GLUCOSE SERPL-MCNC: 111 MG/DL — HIGH (ref 70–99)
GLUCOSE SERPL-MCNC: 111 MG/DL — HIGH (ref 70–99)
HCT VFR BLD CALC: 36.6 % — LOW (ref 39–50)
HCT VFR BLD CALC: 36.6 % — LOW (ref 39–50)
HCV AB S/CO SERPL IA: 0.06 S/CO — SIGNIFICANT CHANGE UP (ref 0–0.99)
HCV AB S/CO SERPL IA: 0.06 S/CO — SIGNIFICANT CHANGE UP (ref 0–0.99)
HCV AB SERPL-IMP: SIGNIFICANT CHANGE UP
HCV AB SERPL-IMP: SIGNIFICANT CHANGE UP
HGB BLD-MCNC: 11.8 G/DL — LOW (ref 13–17)
HGB BLD-MCNC: 11.8 G/DL — LOW (ref 13–17)
IMM GRANULOCYTES NFR BLD AUTO: 0.6 % — SIGNIFICANT CHANGE UP (ref 0–0.9)
IMM GRANULOCYTES NFR BLD AUTO: 0.6 % — SIGNIFICANT CHANGE UP (ref 0–0.9)
INR BLD: 0.98 RATIO — SIGNIFICANT CHANGE UP (ref 0.85–1.18)
INR BLD: 0.98 RATIO — SIGNIFICANT CHANGE UP (ref 0.85–1.18)
LYMPHOCYTES # BLD AUTO: 0.84 K/UL — LOW (ref 1–3.3)
LYMPHOCYTES # BLD AUTO: 0.84 K/UL — LOW (ref 1–3.3)
LYMPHOCYTES # BLD AUTO: 17.2 % — SIGNIFICANT CHANGE UP (ref 13–44)
LYMPHOCYTES # BLD AUTO: 17.2 % — SIGNIFICANT CHANGE UP (ref 13–44)
MCHC RBC-ENTMCNC: 27.9 PG — SIGNIFICANT CHANGE UP (ref 27–34)
MCHC RBC-ENTMCNC: 27.9 PG — SIGNIFICANT CHANGE UP (ref 27–34)
MCHC RBC-ENTMCNC: 32.2 GM/DL — SIGNIFICANT CHANGE UP (ref 32–36)
MCHC RBC-ENTMCNC: 32.2 GM/DL — SIGNIFICANT CHANGE UP (ref 32–36)
MCV RBC AUTO: 86.5 FL — SIGNIFICANT CHANGE UP (ref 80–100)
MCV RBC AUTO: 86.5 FL — SIGNIFICANT CHANGE UP (ref 80–100)
MONOCYTES # BLD AUTO: 0.46 K/UL — SIGNIFICANT CHANGE UP (ref 0–0.9)
MONOCYTES # BLD AUTO: 0.46 K/UL — SIGNIFICANT CHANGE UP (ref 0–0.9)
MONOCYTES NFR BLD AUTO: 9.4 % — SIGNIFICANT CHANGE UP (ref 2–14)
MONOCYTES NFR BLD AUTO: 9.4 % — SIGNIFICANT CHANGE UP (ref 2–14)
NEUTROPHILS # BLD AUTO: 3.32 K/UL — SIGNIFICANT CHANGE UP (ref 1.8–7.4)
NEUTROPHILS # BLD AUTO: 3.32 K/UL — SIGNIFICANT CHANGE UP (ref 1.8–7.4)
NEUTROPHILS NFR BLD AUTO: 68.1 % — SIGNIFICANT CHANGE UP (ref 43–77)
NEUTROPHILS NFR BLD AUTO: 68.1 % — SIGNIFICANT CHANGE UP (ref 43–77)
NRBC # BLD: 0 /100 WBCS — SIGNIFICANT CHANGE UP (ref 0–0)
NRBC # BLD: 0 /100 WBCS — SIGNIFICANT CHANGE UP (ref 0–0)
PLATELET # BLD AUTO: 198 K/UL — SIGNIFICANT CHANGE UP (ref 150–400)
PLATELET # BLD AUTO: 198 K/UL — SIGNIFICANT CHANGE UP (ref 150–400)
POTASSIUM SERPL-MCNC: 4.1 MMOL/L — SIGNIFICANT CHANGE UP (ref 3.5–5.3)
POTASSIUM SERPL-MCNC: 4.1 MMOL/L — SIGNIFICANT CHANGE UP (ref 3.5–5.3)
POTASSIUM SERPL-SCNC: 4.1 MMOL/L — SIGNIFICANT CHANGE UP (ref 3.5–5.3)
POTASSIUM SERPL-SCNC: 4.1 MMOL/L — SIGNIFICANT CHANGE UP (ref 3.5–5.3)
PROT SERPL-MCNC: 6.9 G/DL — SIGNIFICANT CHANGE UP (ref 6–8.3)
PROT SERPL-MCNC: 6.9 G/DL — SIGNIFICANT CHANGE UP (ref 6–8.3)
PROTHROM AB SERPL-ACNC: 11.5 SEC — SIGNIFICANT CHANGE UP (ref 9.5–13)
PROTHROM AB SERPL-ACNC: 11.5 SEC — SIGNIFICANT CHANGE UP (ref 9.5–13)
RBC # BLD: 4.23 M/UL — SIGNIFICANT CHANGE UP (ref 4.2–5.8)
RBC # BLD: 4.23 M/UL — SIGNIFICANT CHANGE UP (ref 4.2–5.8)
RBC # FLD: 13.7 % — SIGNIFICANT CHANGE UP (ref 10.3–14.5)
RBC # FLD: 13.7 % — SIGNIFICANT CHANGE UP (ref 10.3–14.5)
SODIUM SERPL-SCNC: 142 MMOL/L — SIGNIFICANT CHANGE UP (ref 135–145)
SODIUM SERPL-SCNC: 142 MMOL/L — SIGNIFICANT CHANGE UP (ref 135–145)
WBC # BLD: 4.88 K/UL — SIGNIFICANT CHANGE UP (ref 3.8–10.5)
WBC # BLD: 4.88 K/UL — SIGNIFICANT CHANGE UP (ref 3.8–10.5)
WBC # FLD AUTO: 4.88 K/UL — SIGNIFICANT CHANGE UP (ref 3.8–10.5)
WBC # FLD AUTO: 4.88 K/UL — SIGNIFICANT CHANGE UP (ref 3.8–10.5)

## 2023-10-31 PROCEDURE — 99222 1ST HOSP IP/OBS MODERATE 55: CPT

## 2023-10-31 PROCEDURE — 28103 REMOVE/GRAFT FOOT LESION: CPT | Mod: RT

## 2023-10-31 PROCEDURE — 88304 TISSUE EXAM BY PATHOLOGIST: CPT | Mod: 26

## 2023-10-31 PROCEDURE — 73630 X-RAY EXAM OF FOOT: CPT | Mod: 26,RT

## 2023-10-31 DEVICE — SURGICEL NU-KNIT 6 X 9": Type: IMPLANTABLE DEVICE | Site: RIGHT | Status: FUNCTIONAL

## 2023-10-31 RX ORDER — ATORVASTATIN CALCIUM 80 MG/1
1 TABLET, FILM COATED ORAL
Qty: 0 | Refills: 0 | DISCHARGE

## 2023-10-31 RX ORDER — EMPAGLIFLOZIN 10 MG/1
1 TABLET, FILM COATED ORAL
Qty: 0 | Refills: 0 | DISCHARGE

## 2023-10-31 RX ORDER — SEMAGLUTIDE 0.68 MG/ML
1 INJECTION, SOLUTION SUBCUTANEOUS
Refills: 0 | DISCHARGE

## 2023-10-31 RX ORDER — GABAPENTIN 400 MG/1
300 CAPSULE ORAL
Qty: 0 | Refills: 0 | DISCHARGE

## 2023-10-31 RX ORDER — BUPIVACAINE 13.3 MG/ML
20 INJECTION, SUSPENSION, LIPOSOMAL INFILTRATION ONCE
Refills: 0 | Status: DISCONTINUED | OUTPATIENT
Start: 2023-10-31 | End: 2023-11-02

## 2023-10-31 RX ORDER — SITAGLIPTIN AND METFORMIN HYDROCHLORIDE 500; 50 MG/1; MG/1
1 TABLET, FILM COATED ORAL
Qty: 0 | Refills: 0 | DISCHARGE

## 2023-10-31 RX ORDER — METFORMIN HYDROCHLORIDE 850 MG/1
1 TABLET ORAL
Refills: 0 | DISCHARGE

## 2023-10-31 RX ORDER — INSULIN DETEMIR 100/ML (3)
34 INSULIN PEN (ML) SUBCUTANEOUS
Qty: 0 | Refills: 0 | DISCHARGE

## 2023-10-31 RX ORDER — HYDROCHLOROTHIAZIDE 25 MG
1 TABLET ORAL
Qty: 0 | Refills: 0 | DISCHARGE

## 2023-10-31 RX ORDER — METOCLOPRAMIDE HCL 10 MG
5 TABLET ORAL ONCE
Refills: 0 | Status: DISCONTINUED | OUTPATIENT
Start: 2023-10-31 | End: 2023-10-31

## 2023-10-31 RX ORDER — ASPIRIN/CALCIUM CARB/MAGNESIUM 324 MG
81 TABLET ORAL DAILY
Refills: 0 | Status: DISCONTINUED | OUTPATIENT
Start: 2023-10-31 | End: 2023-11-02

## 2023-10-31 RX ORDER — HYDROMORPHONE HYDROCHLORIDE 2 MG/ML
0.5 INJECTION INTRAMUSCULAR; INTRAVENOUS; SUBCUTANEOUS
Refills: 0 | Status: DISCONTINUED | OUTPATIENT
Start: 2023-10-31 | End: 2023-10-31

## 2023-10-31 RX ORDER — GLIMEPIRIDE 1 MG
1 TABLET ORAL
Qty: 0 | Refills: 0 | DISCHARGE

## 2023-10-31 RX ORDER — SODIUM CHLORIDE 9 MG/ML
1000 INJECTION, SOLUTION INTRAVENOUS
Refills: 0 | Status: DISCONTINUED | OUTPATIENT
Start: 2023-10-31 | End: 2023-10-31

## 2023-10-31 RX ORDER — ENOXAPARIN SODIUM 100 MG/ML
40 INJECTION SUBCUTANEOUS ONCE
Refills: 0 | Status: COMPLETED | OUTPATIENT
Start: 2023-10-31 | End: 2023-11-01

## 2023-10-31 RX ORDER — CEFAZOLIN SODIUM 1 G
2000 VIAL (EA) INJECTION ONCE
Refills: 0 | Status: COMPLETED | OUTPATIENT
Start: 2023-10-31 | End: 2023-10-31

## 2023-10-31 RX ORDER — ASPIRIN/CALCIUM CARB/MAGNESIUM 324 MG
1 TABLET ORAL
Qty: 0 | Refills: 0 | DISCHARGE

## 2023-10-31 RX ORDER — OXYCODONE HYDROCHLORIDE 5 MG/1
5 TABLET ORAL ONCE
Refills: 0 | Status: DISCONTINUED | OUTPATIENT
Start: 2023-10-31 | End: 2023-10-31

## 2023-10-31 RX ORDER — LOSARTAN POTASSIUM 100 MG/1
1 TABLET, FILM COATED ORAL
Qty: 0 | Refills: 0 | DISCHARGE

## 2023-10-31 RX ORDER — CEFAZOLIN SODIUM 1 G
2000 VIAL (EA) INJECTION EVERY 8 HOURS
Refills: 0 | Status: DISCONTINUED | OUTPATIENT
Start: 2023-10-31 | End: 2023-11-02

## 2023-10-31 RX ORDER — OMEPRAZOLE 10 MG/1
1 CAPSULE, DELAYED RELEASE ORAL
Qty: 0 | Refills: 0 | DISCHARGE

## 2023-10-31 RX ORDER — FAMOTIDINE 10 MG/ML
20 INJECTION INTRAVENOUS
Qty: 0 | Refills: 0 | DISCHARGE

## 2023-10-31 RX ORDER — METOPROLOL TARTRATE 50 MG
1 TABLET ORAL
Qty: 0 | Refills: 0 | DISCHARGE

## 2023-10-31 RX ADMIN — SODIUM CHLORIDE 50 MILLILITER(S): 9 INJECTION INTRAMUSCULAR; INTRAVENOUS; SUBCUTANEOUS at 06:09

## 2023-10-31 RX ADMIN — Medication 3 MILLIGRAM(S): at 01:15

## 2023-10-31 RX ADMIN — Medication 2: at 17:40

## 2023-10-31 RX ADMIN — MORPHINE SULFATE 2 MILLIGRAM(S): 50 CAPSULE, EXTENDED RELEASE ORAL at 20:26

## 2023-10-31 RX ADMIN — ATORVASTATIN CALCIUM 80 MILLIGRAM(S): 80 TABLET, FILM COATED ORAL at 20:00

## 2023-10-31 RX ADMIN — GABAPENTIN 300 MILLIGRAM(S): 400 CAPSULE ORAL at 05:33

## 2023-10-31 RX ADMIN — SENNA PLUS 2 TABLET(S): 8.6 TABLET ORAL at 20:01

## 2023-10-31 RX ADMIN — GABAPENTIN 300 MILLIGRAM(S): 400 CAPSULE ORAL at 17:02

## 2023-10-31 RX ADMIN — MORPHINE SULFATE 2 MILLIGRAM(S): 50 CAPSULE, EXTENDED RELEASE ORAL at 20:00

## 2023-10-31 RX ADMIN — TRAMADOL HYDROCHLORIDE 50 MILLIGRAM(S): 50 TABLET ORAL at 17:01

## 2023-10-31 RX ADMIN — LOSARTAN POTASSIUM 100 MILLIGRAM(S): 100 TABLET, FILM COATED ORAL at 05:33

## 2023-10-31 RX ADMIN — Medication 50 MILLIGRAM(S): at 05:33

## 2023-10-31 RX ADMIN — Medication 100 MILLIGRAM(S): at 13:11

## 2023-10-31 RX ADMIN — Medication 100 MILLIGRAM(S): at 12:14

## 2023-10-31 RX ADMIN — SODIUM CHLORIDE 100 MILLILITER(S): 9 INJECTION, SOLUTION INTRAVENOUS at 10:44

## 2023-10-31 RX ADMIN — Medication 1 MILLIGRAM(S): at 12:13

## 2023-10-31 RX ADMIN — Medication 100 MILLIGRAM(S): at 21:39

## 2023-10-31 RX ADMIN — Medication 1 TABLET(S): at 12:13

## 2023-10-31 NOTE — DISCHARGE NOTE PROVIDER - NSDCFUSCHEDAPPT_GEN_ALL_CORE_FT
Donnie Trivedi  [facility]  [address] [facility]  Scheduled Appointment: 12/07/2023

## 2023-10-31 NOTE — DISCHARGE NOTE PROVIDER - CARE PROVIDER_API CALL
Kieran Blue  [facility]  [address] [place] [address]  Phone: [phone]  Fax: [phone]  Follow Up Time:    Kieran Blue-Cezar  Foot Surgery  [address] [place] [address]  Phone: [phone]  Fax: [phone]  Follow Up Time:     Marques Rdz  Internal Medicine  [address] [place] [address]  Phone: [phone]  Fax: [phone]  Follow Up Time: 2 weeks

## 2023-10-31 NOTE — DISCHARGE NOTE PROVIDER - NSDCFUADDAPPT_GEN_ALL_CORE_FT
Please call and make an appointment with your medical doctor.  Please call and make an appointment with your primary care provider / medical doctor and private endocrinologist within 1-2 weeks after discharge .Please do not use alcohol while taking pain medication ( percocet ) .No driving until cleared by  .Please schedule appointment with podiatry team within 5 days after discharge from the hospital .Please follow the rest of instructions provided by surgical /podiatry team. Please call and make an appointment with your primary care provider / medical doctor and private endocrinologist within 1-2 weeks after discharge .Please do not use alcohol while taking pain medication ( percocet ) .No driving until cleared by  .Please schedule appointment with podiatry team within one week  after discharge from the hospital .Please follow the rest of instructions provided by surgical /podiatry team.

## 2023-10-31 NOTE — DIETITIAN INITIAL EVALUATION ADULT - PROBLEM SELECTOR PLAN 1
Benign right calcaneal intraosseous lipoma, Milgram stage II. Scheduled for surgery-lipoma excision  in am ,case d/w Dr Blue , patient and wife at bedside

## 2023-10-31 NOTE — DISCHARGE NOTE PROVIDER - NSDCCPTREATMENT_GEN_ALL_CORE_FT
PRINCIPAL PROCEDURE  Procedure: Surgical removal of bone cyst from calcaneus  Findings and Treatment:

## 2023-10-31 NOTE — DISCHARGE NOTE PROVIDER - NSDCCPCAREPLAN_GEN_ALL_CORE_FT
PRINCIPAL DISCHARGE DIAGNOSIS  Diagnosis: Neoplasm of bone of right foot  Assessment and Plan of Treatment:       SECONDARY DISCHARGE DIAGNOSES  Diagnosis: DM (diabetes mellitus)  Assessment and Plan of Treatment: Continue current medical management.    Diagnosis: HLD (hyperlipidemia)  Assessment and Plan of Treatment: Continue current medical management.    Diagnosis: HTN (hypertension)  Assessment and Plan of Treatment: Continue current medical management.

## 2023-10-31 NOTE — DIETITIAN INITIAL EVALUATION ADULT - ORAL INTAKE PTA/DIET HISTORY
Pt reports good appetite/intake PTA. Typically consumes 2 meals/day and a snack in between meals (B- eggs, villaseñor/sausage, roll, Snack- pretzels, D- protein, vegetable/salads). Pt prepares own breakfast meal, wife prepares dinner. Wife reports cutting down on carbohydrates lately. Consumes 4-5 beers/day.

## 2023-10-31 NOTE — PHYSICAL THERAPY INITIAL EVALUATION ADULT - ADDITIONAL COMMENTS
Patient lives in private home, states no stairs and resides on main floor. Patient was independent in all ADLs and ambulates independently without device.

## 2023-10-31 NOTE — DISCHARGE NOTE PROVIDER - NSDCCAREPROVSEEN_GEN_ALL_CORE_FT
Cliff, Yuly Blue, Kieran Farfan, Elton Potter, Cali Salinas, Dank Walter, Yani Campbell, Aleksander Weil, Ashlee

## 2023-10-31 NOTE — PROGRESS NOTE ADULT - SUBJECTIVE AND OBJECTIVE BOX
PROGRESS NOTE  Patient is a 68y old  Male who presents with a chief complaint of right heel pain (31 Oct 2023 15:37)    Chart and available morning labs /imaging are reviewed electronically , urgent issues addressed . More information  is being added upon completion of rounds , when more information is collected and management discussed with consultants , medical staff and social service/case management on the floor   OVERNIGHT  s/p sx  No new issues reported by medical staff . All above noted Patient is resting in a bed comfortably .Wife and daughter are at bedside  .No distress noted   HPI:  67 yo M PMHx HTN, HLD, DM, GERD sent to ED by Dr. 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 Pt c/o chronic heel pain x weeks, otherwise feeling ok . Pt denies fever/chills, numbness/tingling. Surgery planned for tomorrow and card clearance requested Addiction medicine cons called due to ETOH addiction hx ( patient drinks 5 beers a day , quit smoking 12 yrs ago ,patient  used to drink and smoke much more as per wife ) (30 Oct 2023 12:02)    PAST MEDICAL & SURGICAL HISTORY:  HTN (hypertension)      HLD (hyperlipidemia)      DM (diabetes mellitus)      Neuropathy      GERD (gastroesophageal reflux disease)          MEDICATIONS  (STANDING):  aspirin enteric coated 81 milliGRAM(s) Oral daily  atorvastatin 80 milliGRAM(s) Oral at bedtime  BUpivacaine liposome 1.3% Injectable 20 milliLiter(s) Local Injection once  ceFAZolin   IVPB 2000 milliGRAM(s) IV Intermittent every 8 hours  dextrose 5%. 1000 milliLiter(s) (100 mL/Hr) IV Continuous <Continuous>  dextrose 5%. 1000 milliLiter(s) (50 mL/Hr) IV Continuous <Continuous>  dextrose 50% Injectable 25 Gram(s) IV Push once  dextrose 50% Injectable 12.5 Gram(s) IV Push once  dextrose 50% Injectable 25 Gram(s) IV Push once  enoxaparin Injectable 40 milliGRAM(s) SubCutaneous once  folic acid 1 milliGRAM(s) Oral daily  gabapentin 300 milliGRAM(s) Oral two times a day  glucagon  Injectable 1 milliGRAM(s) IntraMuscular once  insulin lispro (ADMELOG) corrective regimen sliding scale   SubCutaneous three times a day before meals  insulin lispro (ADMELOG) corrective regimen sliding scale   SubCutaneous at bedtime  losartan 100 milliGRAM(s) Oral daily  metoprolol succinate ER 50 milliGRAM(s) Oral daily  multivitamin 1 Tablet(s) Oral daily  pantoprazole    Tablet 40 milliGRAM(s) Oral before breakfast  senna 2 Tablet(s) Oral at bedtime  sodium chloride 0.9%. 1000 milliLiter(s) (50 mL/Hr) IV Continuous <Continuous>  thiamine 100 milliGRAM(s) Oral daily    MEDICATIONS  (PRN):  acetaminophen     Tablet .. 650 milliGRAM(s) Oral every 6 hours PRN Temp greater or equal to 38C (100.4F), Mild Pain (1 - 3)  aluminum hydroxide/magnesium hydroxide/simethicone Suspension 30 milliLiter(s) Oral every 4 hours PRN Dyspepsia  dextrose Oral Gel 15 Gram(s) Oral once PRN Blood Glucose LESS THAN 70 milliGRAM(s)/deciliter  LORazepam     Tablet 1 milliGRAM(s) Oral every 2 hours PRN for ciwa > 5  LORazepam   Injectable 2 milliGRAM(s) IV Push every 30 minutes PRN for CIWA > 8  magnesium hydroxide Suspension 30 milliLiter(s) Oral daily PRN Constipation  melatonin 3 milliGRAM(s) Oral at bedtime PRN Insomnia  morphine  - Injectable 2 milliGRAM(s) IV Push every 6 hours PRN Severe Pain (7 - 10)  ondansetron Injectable 4 milliGRAM(s) IV Push every 8 hours PRN Nausea and/or Vomiting  traMADol 50 milliGRAM(s) Oral three times a day PRN Moderate Pain (4 - 6)      OBJECTIVE    T(C): 36.8 (10-31-23 @ 12:16), Max: 37 (10-31-23 @ 10:36)  HR: 61 (10-31-23 @ 12:16) (59 - 73)  BP: 146/80 (10-31-23 @ 12:16) (132/63 - 167/70)  RR: 18 (10-31-23 @ 12:16) (16 - 18)  SpO2: 96% (10-31-23 @ 12:16) (91% - 98%)  Wt(kg): --  I&O's Summary        REVIEW OF SYSTEMS:  CONSTITUTIONAL: No fever, weight loss, or fatigue  EYES: No eye pain, visual disturbances, or discharge  ENMT:   No sinus or throat pain  NECK: No pain or stiffness  RESPIRATORY: No cough, wheezing, chills or hemoptysis; No shortness of breath  CARDIOVASCULAR: No chest pain, palpitations, dizziness, or leg swelling  GASTROINTESTINAL: No abdominal pain. No nausea, vomiting; No diarrhea or constipation. No melena or hematochezia.  GENITOURINARY: No dysuria, frequency, hematuria, or incontinence  NEUROLOGICAL: No headaches, memory loss, loss of strength, numbness, or tremors  SKIN: No itching, burning, rashes, or lesions   MUSCULOSKELETAL: No joint pain or swelling; No muscle, back, or extremity pain    PHYSICAL EXAM:  Appearance: NAD. VS past 24 hrs -as above   HEENT:   Moist oral mucosa. Conjunctiva clear b/l.   Neck : supple  Respiratory: Lungs CTAB.  Gastrointestinal:  Soft, nontender. No rebound. No rigidity. BS present	  Cardiovascular: RRR ,S1S2 present  Neurologic: Non-focal. Moving all extremities.  Extremities: No edema. No erythema. No calf tenderness.  Skin: No rashes, No ecchymoses, No cyanosis.	  wounds ,skin lesions-See skin assesment flow sheet   LABS:                        11.8   4.88  )-----------( 198      ( 31 Oct 2023 06:17 )             36.6     10-31    142  |  105  |  13  ----------------------------<  111<H>  4.1   |  28  |  0.96    Ca    9.4      31 Oct 2023 06:17    TPro  6.9  /  Alb  3.7  /  TBili  0.9  /  DBili  x   /  AST  24  /  ALT  32  /  AlkPhos  82  10-31    CAPILLARY BLOOD GLUCOSE      POCT Blood Glucose.: 145 mg/dL (31 Oct 2023 12:11)  POCT Blood Glucose.: 137 mg/dL (31 Oct 2023 10:39)  POCT Blood Glucose.: 105 mg/dL (31 Oct 2023 06:13)  POCT Blood Glucose.: 150 mg/dL (30 Oct 2023 21:08)  POCT Blood Glucose.: 114 mg/dL (30 Oct 2023 17:21)    PT/INR - ( 31 Oct 2023 06:17 )   PT: 11.5 sec;   INR: 0.98 ratio           Urinalysis Basic - ( 31 Oct 2023 06:17 )    Color: x / Appearance: x / SG: x / pH: x  Gluc: 111 mg/dL / Ketone: x  / Bili: x / Urobili: x   Blood: x / Protein: x / Nitrite: x   Leuk Esterase: x / RBC: x / WBC x   Sq Epi: x / Non Sq Epi: x / Bacteria: x        RADIOLOGY & ADDITIONAL TESTS:   reviewed elctronically  ASSESSMENT/PLAN: 	  25 minutes aggregate time was spent on this visit, 50% visit time spent in care co-ordination with other attendings and counselling patient .I have discussed care plan with patient / HCP/family member ,who expressed understanding of problems treatment and their effect and side effects, alternatives in details. I have asked if they have any questions and concerns and appropriately addressed them to best of my ability.

## 2023-10-31 NOTE — PHYSICAL THERAPY INITIAL EVALUATION ADULT - RANGE OF MOTION EXAMINATION, REHAB EVAL
R LE Ankle in splint/bilateral upper extremity ROM was WNL (within normal limits)/Left LE ROM was WNL (within normal limits)

## 2023-10-31 NOTE — CONSULT NOTE ADULT - SUBJECTIVE AND OBJECTIVE BOX
Patient is a 68y old  Male who presents with a chief complaint of Neoplasm of bone, soft tissue, or skin     (31 Oct 2023 14:24)    Type:2 DX 10 years. known complications:  Endocrine Last seen Rx home Hx DKA/HHS, Glucometer checks, needs, weight, diet, exercise  diabetes education provided  Hx ASCVD, CKD, HF    HPI:  67 yo M PMHx HTN, HLD, DM, GERD sent to ED by Dr. 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 Pt c/o chronic heel pain x weeks, otherwise feeling ok . Pt denies fever/chills, numbness/tingling. Surgery planned for tomorrow and card clearance requested Addiction medicine cons called due to ETOH addiction hx ( patient drinks 5 beers a day , quit smoking 12 yrs ago ,patient  used to drink and smoke much more as per wife ) (30 Oct 2023 12:02)      PAST MEDICAL & SURGICAL HISTORY:  HTN (hypertension)      HLD (hyperlipidemia)      DM (diabetes mellitus)      Neuropathy      GERD (gastroesophageal reflux disease)          Allergies    Naprosyn (Hives; Rash)    Intolerances        MEDICATIONS  (STANDING):  aspirin enteric coated 81 milliGRAM(s) Oral daily  atorvastatin 80 milliGRAM(s) Oral at bedtime  BUpivacaine liposome 1.3% Injectable 20 milliLiter(s) Local Injection once  ceFAZolin   IVPB 2000 milliGRAM(s) IV Intermittent every 8 hours  dextrose 5%. 1000 milliLiter(s) (100 mL/Hr) IV Continuous <Continuous>  dextrose 5%. 1000 milliLiter(s) (50 mL/Hr) IV Continuous <Continuous>  dextrose 50% Injectable 25 Gram(s) IV Push once  dextrose 50% Injectable 25 Gram(s) IV Push once  dextrose 50% Injectable 12.5 Gram(s) IV Push once  enoxaparin Injectable 40 milliGRAM(s) SubCutaneous once  folic acid 1 milliGRAM(s) Oral daily  gabapentin 300 milliGRAM(s) Oral two times a day  glucagon  Injectable 1 milliGRAM(s) IntraMuscular once  insulin lispro (ADMELOG) corrective regimen sliding scale   SubCutaneous at bedtime  insulin lispro (ADMELOG) corrective regimen sliding scale   SubCutaneous three times a day before meals  losartan 100 milliGRAM(s) Oral daily  metoprolol succinate ER 50 milliGRAM(s) Oral daily  multivitamin 1 Tablet(s) Oral daily  pantoprazole    Tablet 40 milliGRAM(s) Oral before breakfast  senna 2 Tablet(s) Oral at bedtime  sodium chloride 0.9%. 1000 milliLiter(s) (50 mL/Hr) IV Continuous <Continuous>  thiamine 100 milliGRAM(s) Oral daily       Patient is a 68y old  Male who presents with a chief complaint of Neoplasm of bone, soft tissue, or skin     (31 Oct 2023 14:24)    Type:2 DX 10 years. known complications: none.   Endocrine: Dr France Laws-  office [phone] Graciela reviewed home medications. Last seen:12 days ago-next appt Dec 18th. Will see if they can arrange a video conference Nov 16. Rx home: Metformin, GOODEN. Jardiance, Rybelsus* newly added. A1c 9.7% now 9.2%. Uses Farooq 2 with reader at home- patient does not want to take any injectable. diabetes education provided verbally and handouts. discussed drinking alcohol with diabetes medications on board- lead to hypoglycemia. wife and 2 dtrs at the bedside during the education with the patient.       HPI:  69 yo M PMHx HTN, HLD, DM, GERD sent to ED by Dr. 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 Pt c/o chronic heel pain x weeks, otherwise feeling ok . Pt denies fever/chills, numbness/tingling. Surgery planned for tomorrow and card clearance requested Addiction medicine cons called due to ETOH addiction hx ( patient drinks 5 beers a day , quit smoking 12 yrs ago ,patient  used to drink and smoke much more as per wife ) (30 Oct 2023 12:02)      PAST MEDICAL & SURGICAL HISTORY:  HTN (hypertension)      HLD (hyperlipidemia)      DM (diabetes mellitus)      Neuropathy      GERD (gastroesophageal reflux disease)          Allergies    Naprosyn (Hives; Rash)    Intolerances        MEDICATIONS  (STANDING):  aspirin enteric coated 81 milliGRAM(s) Oral daily  atorvastatin 80 milliGRAM(s) Oral at bedtime  BUpivacaine liposome 1.3% Injectable 20 milliLiter(s) Local Injection once  ceFAZolin   IVPB 2000 milliGRAM(s) IV Intermittent every 8 hours  dextrose 5%. 1000 milliLiter(s) (100 mL/Hr) IV Continuous <Continuous>  dextrose 5%. 1000 milliLiter(s) (50 mL/Hr) IV Continuous <Continuous>  dextrose 50% Injectable 25 Gram(s) IV Push once  dextrose 50% Injectable 25 Gram(s) IV Push once  dextrose 50% Injectable 12.5 Gram(s) IV Push once  enoxaparin Injectable 40 milliGRAM(s) SubCutaneous once  folic acid 1 milliGRAM(s) Oral daily  gabapentin 300 milliGRAM(s) Oral two times a day  glucagon  Injectable 1 milliGRAM(s) IntraMuscular once  insulin lispro (ADMELOG) corrective regimen sliding scale   SubCutaneous at bedtime  insulin lispro (ADMELOG) corrective regimen sliding scale   SubCutaneous three times a day before meals  losartan 100 milliGRAM(s) Oral daily  metoprolol succinate ER 50 milliGRAM(s) Oral daily  multivitamin 1 Tablet(s) Oral daily  pantoprazole    Tablet 40 milliGRAM(s) Oral before breakfast  senna 2 Tablet(s) Oral at bedtime  sodium chloride 0.9%. 1000 milliLiter(s) (50 mL/Hr) IV Continuous <Continuous>  thiamine 100 milliGRAM(s) Oral daily

## 2023-10-31 NOTE — DIETITIAN INITIAL EVALUATION ADULT - OTHER INFO
Pt is a "67 yo M PMHx HTN, HLD, DM, GERD sent to ED by Dr. Albarran for admission, plan for excision of neoplasm of right heel."    Visited pt at bedside this afternoon. Pt s/p excision of right foot calcaneal bone cyst today. Pt's spouse and daughter present during visit. Pt with good appetite/intake. Tolerating diet well. No food allergies. No chewing/swallowing difficulties. Denies N/V. +BM this am per pt report (10/31). CBW on admission 184#. No edema noted. Skin: intact. Pt with hx of DM, A1c 9.5% obtained. On metformin and insulin at home. CGM at home. Attempted to provide verbal DM diet education during visit. Pt not engaged throughout education, appears noncompliant. Drinking regular ginger ale during visit. Recommend diet reinforcement as medically feasible. Provided pt/family with written education. RD remains available.

## 2023-10-31 NOTE — DIETITIAN INITIAL EVALUATION ADULT - ADD RECOMMEND
1) Recommend consistent carbohydrate, DASH/TLC diet (d/c renal restriction- ordered in error?)  2) Continue MVI, thiamin, folic acid supplement  3) Monitor po intake, diet tolerance, weight trends, labs, GI function, skin integrity

## 2023-10-31 NOTE — CONSULT NOTE ADULT - ASSESSMENT
Physical Exam:   Vital Signs Last 24 Hrs  T(C): 36.8 (31 Oct 2023 12:16), Max: 37 (31 Oct 2023 10:36)  T(F): 98.2 (31 Oct 2023 12:16), Max: 98.6 (31 Oct 2023 10:36)  HR: 61 (31 Oct 2023 12:16) (59 - 73)  BP: 146/80 (31 Oct 2023 12:16) (132/63 - 167/70)  BP(mean): --  RR: 18 (31 Oct 2023 12:16) (16 - 18)  SpO2: 96% (31 Oct 2023 12:16) (91% - 98%)    Parameters below as of 31 Oct 2023 12:16  Patient On (Oxygen Delivery Method): nasal cannula  O2 Flow (L/min): 2           CAPILLARY BLOOD GLUCOSE      POCT Blood Glucose.: 145 mg/dL (31 Oct 2023 12:11)  POCT Blood Glucose.: 137 mg/dL (31 Oct 2023 10:39)  POCT Blood Glucose.: 105 mg/dL (31 Oct 2023 06:13)  POCT Blood Glucose.: 150 mg/dL (30 Oct 2023 21:08)  POCT Blood Glucose.: 114 mg/dL (30 Oct 2023 17:21)      Cholesterol, Serum: 113 mg/dL (05.19.21 @ 08:36)     HDL Cholesterol, Serum: 22 mg/dL (05.19.21 @ 08:36)     LDL Cholesterol Calculated: 66 mg/dL (05.19.21 @ 08:36)     DIET: CC  >50%        
The patient is a 68 year old male with a history of HTN, HL, DM who was sent in for foot surgery.     Plan:  - ECG with no evidence of ischemia or infarction  - Cardiac cath 4/22 with normal coronaries and normal EF  - Continue losartan 100 mg daily  - Continue metoprolol succinate 50 mg daily  - Hold HCTZ for now - resume on discharge  - Continue atorvastatin 80 mg daily  - Aspirin can be held if needed  - The patient is at low/intermediate risk for cardiac events for an intermediate risk surgery. The patient is optimized to proceed from a cardiac standpoint for surgery.
67 yo M PMHx HTN, HLD, DM, GERD sent to ED by Dr. 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    foot neoplasm  etoh use disorder  AMINTA  Beer Drinker  GERD  DM  HTN  HLD  OP  OA    nikkie op optimization  plan for OR with Podiatric Surgery  thiamine  folic acid  ciwa  counseling  education  AA referral  SBIRT documentation   Ativan PRN as per CIWA - PO and IV  
Right heel bone cyst

## 2023-10-31 NOTE — PROGRESS NOTE ADULT - SUBJECTIVE AND OBJECTIVE BOX
Chief Complaint: Foot surgery    Interval Events: No events overnight.    Review of Systems:  General: No fevers, chills, weight gain  Skin: No rashes, color changes  Cardiovascular: No chest pain, orthopnea  Respiratory: No shortness of breath, cough  Gastrointestinal: No nausea, abdominal pain  Genitourinary: No incontinence, pain with urination  Musculoskeletal: No pain, swelling, decreased range of motion  Neurological: No headache, weakness  Psychiatric: No depression, anxiety  Endocrine: No weight gain, increased thirst  All other systems are comprehensively negative.    Physical Exam:  Vitals:        Vital Signs Last 24 Hrs  T(C): 36.7 (31 Oct 2023 06:59), Max: 36.8 (30 Oct 2023 13:57)  T(F): 98.1 (31 Oct 2023 06:59), Max: 98.2 (30 Oct 2023 13:57)  HR: 59 (31 Oct 2023 06:59) (59 - 66)  BP: 160/75 (31 Oct 2023 05:08) (155/68 - 198/77)  BP(mean): --  RR: 16 (31 Oct 2023 06:59) (16 - 18)  SpO2: 93% (31 Oct 2023 06:59) (91% - 98%)  Parameters below as of 31 Oct 2023 06:59  Patient On (Oxygen Delivery Method): room air  General: NAD  HEENT: MMM  Neck: No JVD, no carotid bruit  Lungs: CTAB  CV: RRR, nl S1/S2, no M/R/G  Abdomen: S/NT/ND, +BS  Extremities: No LE edema, no cyanosis  Neuro: AAOx3, non-focal  Skin: No rash    Labs:                        11.8   4.88  )-----------( 198      ( 31 Oct 2023 06:17 )             36.6     10-30    142  |  107  |  11  ----------------------------<  120<H>  4.0   |  27  |  0.89    Ca    9.6      30 Oct 2023 09:35    TPro  7.5  /  Alb  4.1  /  TBili  0.8  /  DBili  x   /  AST  30  /  ALT  35  /  AlkPhos  91  10-30        PT/INR - ( 31 Oct 2023 06:17 )   PT: 11.5 sec;   INR: 0.98 ratio

## 2023-10-31 NOTE — DIETITIAN INITIAL EVALUATION ADULT - NS FNS DIET ORDER
Diet, Consistent Carbohydrate Renal w/Evening Snack:   DASH/TLC {Sodium & Cholesterol Restricted} (10-30-23 @ 11:55) [Active]

## 2023-10-31 NOTE — DISCHARGE NOTE PROVIDER - HOSPITAL COURSE
69 yo M PMHx HTN, HLD, DM, GERD sent to ED by Dr. 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 Pt c/o chronic heel pain x weeks, otherwise feeling ok . Pt denies fever/chills, numbness/tingling. Surgery planned for tomorrow and card clearance requested Addiction medicine cons called due to ETOH addiction hx ( patient drinks 5 beers a day , quit smoking 12 yrs ago ,patient  used to drink and smoke much more as per wife )        Problem/Plan - 1:  ·  Problem: Neoplasm of foot.   ·  Plan: Benign right calcaneal intraosseous lipoma, Milgram stage II. Scheduled for surgery-lipoma excision  in am ,case d/w Dr Blue , patient and wife at bedside.    Problem/Plan - 2:  ·  Problem: DM (diabetes mellitus).   ·  Plan: Accuchecks monitoring and insulin corrective regimen  sliding scale coverage with short acting inslulin, add longacting insulin as needed ,no concentrated sweets diet, serial labs ,HbA1C,education.    Problem/Plan - 3:  ·  Problem: HTN (hypertension).   ·  Plan: continue home medications ,cardiac clearance requested.    Problem/Plan - 4:  ·  Problem: EtOH dependence.  ·  Plan: thiamine  folic acid  ciwa  counseling  education  AA referral  SBIRT documentation   Ativan PRN as per CIWA - PO and IV.    Problem/Plan - 5:  ·  Problem: HLD (hyperlipidemia).  ·  Plan: continue home medications.    Problem/Plan - 6:  ·  Problem: Neuropathy.  ·  Plan: continue home medications -gabapentin.    Problem/Plan - 7:  ·  Problem: Prophylactic measure.   ·  Plan: Gastrointestinal stress ulcer prophylaxis and DVT prophylaxis administered.

## 2023-10-31 NOTE — CONSULT NOTE ADULT - PROBLEM SELECTOR RECOMMENDATION 9
Given the patient's severity of symptoms, will plan for surgical intervention at this time. Discussed risks, benefits, and potential complications with patient and family members regarding surgical intervention including infection, sepsis, loss of limb, and loss of life. Discussed the need for patient to be postoperatively nonweight bearing to the right foot until healing. All questions answered to satisfaction at this time. Requesting medical risk stratification and optimization. Will plan for excision of right foot calcaneal bone cyst on 10/31/23.

## 2023-10-31 NOTE — DIETITIAN INITIAL EVALUATION ADULT - PERTINENT LABORATORY DATA
10-31    142  |  105  |  13  ----------------------------<  111<H>  4.1   |  28  |  0.96    Ca    9.4      31 Oct 2023 06:17    TPro  6.9  /  Alb  3.7  /  TBili  0.9  /  DBili  x   /  AST  24  /  ALT  32  /  AlkPhos  82  10-31  POCT Blood Glucose.: 145 mg/dL (10-31-23 @ 12:11)  A1C with Estimated Average Glucose Result: 9.5 % (10-31-23 @ 06:17)  A1C with Estimated Average Glucose Result: 9.2 % (10-30-23 @ 09:35)   95.68

## 2023-10-31 NOTE — CONSULT NOTE ADULT - SUBJECTIVE AND OBJECTIVE BOX
HPI:  68y year old Male seen at [facility] for painful right heel lesion. 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 Pt relates that he is doing well and denies any other complaints at this time. Denies any fever, chills, nausea, vomiting, chest pain, shortness of breath, or calf pain at this time.      PAST MEDICAL & SURGICAL HISTORY:  HTN (hypertension)      HLD (hyperlipidemia)      DM (diabetes mellitus)      Neuropathy      GERD (gastroesophageal reflux disease)          Allergies    Naprosyn (Hives; Rash)    Intolerances        MEDICATIONS  (STANDING):  atorvastatin 80 milliGRAM(s) Oral at bedtime  BUpivacaine liposome 1.3% Injectable 20 milliLiter(s) Local Injection once  dextrose 5%. 1000 milliLiter(s) (100 mL/Hr) IV Continuous <Continuous>  dextrose 5%. 1000 milliLiter(s) (50 mL/Hr) IV Continuous <Continuous>  dextrose 50% Injectable 25 Gram(s) IV Push once  dextrose 50% Injectable 25 Gram(s) IV Push once  dextrose 50% Injectable 12.5 Gram(s) IV Push once  folic acid 1 milliGRAM(s) Oral daily  gabapentin 300 milliGRAM(s) Oral two times a day  glucagon  Injectable 1 milliGRAM(s) IntraMuscular once  insulin lispro (ADMELOG) corrective regimen sliding scale   SubCutaneous three times a day before meals  insulin lispro (ADMELOG) corrective regimen sliding scale   SubCutaneous at bedtime  losartan 100 milliGRAM(s) Oral daily  metoprolol succinate ER 50 milliGRAM(s) Oral daily  multivitamin 1 Tablet(s) Oral daily  pantoprazole    Tablet 40 milliGRAM(s) Oral before breakfast  senna 2 Tablet(s) Oral at bedtime  sodium chloride 0.9%. 1000 milliLiter(s) (50 mL/Hr) IV Continuous <Continuous>  thiamine 100 milliGRAM(s) Oral daily    MEDICATIONS  (PRN):  acetaminophen     Tablet .. 650 milliGRAM(s) Oral every 6 hours PRN Temp greater or equal to 38C (100.4F), Mild Pain (1 - 3)  aluminum hydroxide/magnesium hydroxide/simethicone Suspension 30 milliLiter(s) Oral every 4 hours PRN Dyspepsia  dextrose Oral Gel 15 Gram(s) Oral once PRN Blood Glucose LESS THAN 70 milliGRAM(s)/deciliter  LORazepam     Tablet 1 milliGRAM(s) Oral every 2 hours PRN for ciwa > 5  LORazepam   Injectable 2 milliGRAM(s) IV Push every 30 minutes PRN for CIWA > 8  magnesium hydroxide Suspension 30 milliLiter(s) Oral daily PRN Constipation  melatonin 3 milliGRAM(s) Oral at bedtime PRN Insomnia  morphine  - Injectable 2 milliGRAM(s) IV Push every 6 hours PRN Severe Pain (7 - 10)  ondansetron Injectable 4 milliGRAM(s) IV Push every 8 hours PRN Nausea and/or Vomiting  traMADol 50 milliGRAM(s) Oral three times a day PRN Moderate Pain (4 - 6)      Social History:  etoh (30 Oct 2023 12:02)      FAMILY HISTORY:      Vital Signs Last 24 Hrs  T(C): 36.7 (31 Oct 2023 06:59), Max: 36.8 (30 Oct 2023 13:57)  T(F): 98.1 (31 Oct 2023 06:59), Max: 98.2 (30 Oct 2023 13:57)  HR: 59 (31 Oct 2023 06:59) (59 - 66)  BP: 160/75 (31 Oct 2023 05:08) (155/68 - 198/77)  BP(mean): --  RR: 16 (31 Oct 2023 06:59) (16 - 18)  SpO2: 93% (31 Oct 2023 06:59) (91% - 98%)    Parameters below as of 31 Oct 2023 06:59  Patient On (Oxygen Delivery Method): room air        PHYSICAL EXAM:  Vascular: DP & PT palpable bilaterally, Capillary refill 3 seconds, Digital hair present bilaterally  Neurological: Light touch sensation intact bilaterally  Musculoskeletal: 5/5 strength in all quadrants bilaterally, AJ & STJ ROM intact, Tenderness to palpation of the right calcaneus  Dermatological: No erythema, no ecchymosis, no laceration, no maceration, no open lesions                          12.8   4.55  )-----------( 196      ( 30 Oct 2023 09:35 )             39.4       10-30    142  |  107  |  11  ----------------------------<  120<H>  4.0   |  27  |  0.89    Ca    9.6      30 Oct 2023 09:35    TPro  7.5  /  Alb  4.1  /  TBili  0.8  /  DBili  x   /  AST  30  /  ALT  35  /  AlkPhos  91  10-30      PT/INR - ( 31 Oct 2023 06:17 )   PT: 11.5 sec;   INR: 0.98 ratio                 Imaging: CT shows a right calcaneal intraosseous lesion

## 2023-10-31 NOTE — DISCHARGE NOTE PROVIDER - NSDCMRMEDTOKEN_GEN_ALL_CORE_FT
Aspir 81 oral delayed release tablet: 1 tab(s) orally once a day  atorvastatin 80 mg oral tablet: 1 tab(s) orally once a day  empagliflozin 25 mg oral tablet: 1 tab(s) orally once a day (in the morning)  famotidine 20 mg oral tablet: 20 milligram(s) orally once a day (at bedtime)  gabapentin 300 mg oral capsule: 300 milligram(s) orally 2 times a day  glimepiride 4 mg oral tablet: 1 tab(s) orally once a day  hydroCHLOROthiazide 12.5 mg oral tablet: 1 tab(s) orally once a day  Levemir FlexTouch 100 units/mL subcutaneous solution: 34 unit(s) subcutaneous  losartan 100 mg oral tablet: 1 tab(s) orally once a day  Metoprolol Succinate ER 50 mg oral tablet, extended release: 1 tab(s) orally once a day  omeprazole 20 mg oral delayed release capsule: 1 cap(s) orally once a day  sitagliptin-metformin 50 mg-1000 mg oral tablet: 1 tab(s) orally 2 times a day  Resume on 4/16/22   acetaminophen 500 mg oral tablet: 2 tab(s) orally every 8 hours as needed for  mild pain  Aspir 81 oral delayed release tablet: 1 tab(s) orally once a day  atorvastatin 80 mg oral tablet: 1 tab(s) orally once a day  cephalexin 500 mg oral capsule: 1 cap(s) orally every 6 hours x 7 days  empagliflozin 25 mg oral tablet: 1 tab(s) orally once a day (in the morning)  famotidine 20 mg oral tablet: 20 milligram(s) orally once a day (at bedtime)  folic acid 1 mg oral tablet: 1 tab(s) orally once a day  gabapentin 300 mg oral capsule: 300 milligram(s) orally 2 times a day  glimepiride 4 mg oral tablet: 1 tab(s) orally once a day  hydroCHLOROthiazide 12.5 mg oral tablet: 1 tab(s) orally once a day  lactobacillus acidophilus oral capsule: 1 cap(s) orally 2 times a day  losartan 100 mg oral tablet: 1 tab(s) orally once a day  melatonin 3 mg oral tablet: 1 tab(s) orally once a day (at bedtime) As needed Insomnia  MetFORMIN (Eqv-Glumetza) 1000 mg oral tablet, extended release: 1 tab(s) orally 2 times a day  Metoprolol Succinate ER 50 mg oral tablet, extended release: 1 tab(s) orally once a day  Multiple Vitamins oral tablet: 1 tab(s) orally once a day  omeprazole 20 mg oral delayed release capsule: 1 cap(s) orally once a day  oxycodone-acetaminophen 5 mg-325 mg oral tablet: 1 tab(s) orally every 4 hours As needed Moderate Pain (4 - 6)  Rybelsus 7 mg oral tablet: 1 tab(s) orally once a day  senna leaf extract oral tablet: 2 tab(s) orally once a day (at bedtime)  thiamine 100 mg oral tablet: 1 tab(s) orally once a day

## 2023-10-31 NOTE — CONSULT NOTE ADULT - PROBLEM SELECTOR RECOMMENDATION 9
Type 2 A1c 9.2% adm   Recommend endocrine-[facility]lman on consult  FU appt: TBA  DSC recommendations: return to home regimen and glucose monitoring  diabetes education provided  Diabetes support info and cell # [phone] given   Goal 100-180 mg/dL; 140-180 mg/dL in critical care areas Type 2 A1c 9.2% adm   Recommend endocrine-Perlman on consult  no change in home regimen  FU appt: video conference Thursday Nov 16th Dr Laws  DSC recommendations: return to home regimen and glucose monitoring  diabetes education provided  Diabetes support info and cell # [phone] given   Goal 100-180 mg/dL; 140-180 mg/dL in critical care areas

## 2023-10-31 NOTE — DIETITIAN INITIAL EVALUATION ADULT - PERTINENT MEDS FT
MEDICATIONS  (STANDING):  aspirin enteric coated 81 milliGRAM(s) Oral daily  atorvastatin 80 milliGRAM(s) Oral at bedtime  BUpivacaine liposome 1.3% Injectable 20 milliLiter(s) Local Injection once  ceFAZolin   IVPB 2000 milliGRAM(s) IV Intermittent every 8 hours  dextrose 5%. 1000 milliLiter(s) (100 mL/Hr) IV Continuous <Continuous>  dextrose 5%. 1000 milliLiter(s) (50 mL/Hr) IV Continuous <Continuous>  dextrose 50% Injectable 25 Gram(s) IV Push once  dextrose 50% Injectable 12.5 Gram(s) IV Push once  dextrose 50% Injectable 25 Gram(s) IV Push once  enoxaparin Injectable 40 milliGRAM(s) SubCutaneous once  folic acid 1 milliGRAM(s) Oral daily  gabapentin 300 milliGRAM(s) Oral two times a day  glucagon  Injectable 1 milliGRAM(s) IntraMuscular once  insulin lispro (ADMELOG) corrective regimen sliding scale   SubCutaneous at bedtime  insulin lispro (ADMELOG) corrective regimen sliding scale   SubCutaneous three times a day before meals  losartan 100 milliGRAM(s) Oral daily  metoprolol succinate ER 50 milliGRAM(s) Oral daily  multivitamin 1 Tablet(s) Oral daily  pantoprazole    Tablet 40 milliGRAM(s) Oral before breakfast  senna 2 Tablet(s) Oral at bedtime  sodium chloride 0.9%. 1000 milliLiter(s) (50 mL/Hr) IV Continuous <Continuous>  thiamine 100 milliGRAM(s) Oral daily    MEDICATIONS  (PRN):  acetaminophen     Tablet .. 650 milliGRAM(s) Oral every 6 hours PRN Temp greater or equal to 38C (100.4F), Mild Pain (1 - 3)  aluminum hydroxide/magnesium hydroxide/simethicone Suspension 30 milliLiter(s) Oral every 4 hours PRN Dyspepsia  dextrose Oral Gel 15 Gram(s) Oral once PRN Blood Glucose LESS THAN 70 milliGRAM(s)/deciliter  LORazepam     Tablet 1 milliGRAM(s) Oral every 2 hours PRN for ciwa > 5  LORazepam   Injectable 2 milliGRAM(s) IV Push every 30 minutes PRN for CIWA > 8  magnesium hydroxide Suspension 30 milliLiter(s) Oral daily PRN Constipation  melatonin 3 milliGRAM(s) Oral at bedtime PRN Insomnia  morphine  - Injectable 2 milliGRAM(s) IV Push every 6 hours PRN Severe Pain (7 - 10)  ondansetron Injectable 4 milliGRAM(s) IV Push every 8 hours PRN Nausea and/or Vomiting  traMADol 50 milliGRAM(s) Oral three times a day PRN Moderate Pain (4 - 6)

## 2023-10-31 NOTE — DISCHARGE NOTE PROVIDER - PROVIDER TOKENS
Home PROVIDER:[TOKEN:[[id number]]] PROVIDER:[TOKEN:[[id number]]],PROVIDER:[TOKEN:[[id number]],FOLLOWUP:[2 weeks]]

## 2023-10-31 NOTE — DISCHARGE NOTE PROVIDER - NSDCFUADDINST_GEN_ALL_CORE_FT
Wound Care Instructions:  Please keep right lower leg dressing and splint intact, dry, and clean until your follow up within 1 week upon discharge from the hospital.

## 2023-10-31 NOTE — PROGRESS NOTE ADULT - SUBJECTIVE AND OBJECTIVE BOX
Date/Time Patient Seen:  		  Referring MD:   Data Reviewed	       Patient is a 68y old  Male who presents with a chief complaint of right heel pain (30 Oct 2023 19:49)      Subjective/HPI     PAST MEDICAL & SURGICAL HISTORY:  HTN (hypertension)    HLD (hyperlipidemia)    DM (diabetes mellitus)    Neuropathy    GERD (gastroesophageal reflux disease)          Medication list         MEDICATIONS  (STANDING):  atorvastatin 80 milliGRAM(s) Oral at bedtime  dextrose 5%. 1000 milliLiter(s) (100 mL/Hr) IV Continuous <Continuous>  dextrose 5%. 1000 milliLiter(s) (50 mL/Hr) IV Continuous <Continuous>  dextrose 50% Injectable 25 Gram(s) IV Push once  dextrose 50% Injectable 12.5 Gram(s) IV Push once  dextrose 50% Injectable 25 Gram(s) IV Push once  folic acid 1 milliGRAM(s) Oral daily  gabapentin 300 milliGRAM(s) Oral two times a day  glucagon  Injectable 1 milliGRAM(s) IntraMuscular once  insulin lispro (ADMELOG) corrective regimen sliding scale   SubCutaneous three times a day before meals  insulin lispro (ADMELOG) corrective regimen sliding scale   SubCutaneous at bedtime  losartan 100 milliGRAM(s) Oral daily  metoprolol succinate ER 50 milliGRAM(s) Oral daily  multivitamin 1 Tablet(s) Oral daily  pantoprazole    Tablet 40 milliGRAM(s) Oral before breakfast  senna 2 Tablet(s) Oral at bedtime  sodium chloride 0.9%. 1000 milliLiter(s) (50 mL/Hr) IV Continuous <Continuous>  thiamine 100 milliGRAM(s) Oral daily    MEDICATIONS  (PRN):  acetaminophen     Tablet .. 650 milliGRAM(s) Oral every 6 hours PRN Temp greater or equal to 38C (100.4F), Mild Pain (1 - 3)  aluminum hydroxide/magnesium hydroxide/simethicone Suspension 30 milliLiter(s) Oral every 4 hours PRN Dyspepsia  dextrose Oral Gel 15 Gram(s) Oral once PRN Blood Glucose LESS THAN 70 milliGRAM(s)/deciliter  LORazepam     Tablet 1 milliGRAM(s) Oral every 2 hours PRN for ciwa > 5  LORazepam   Injectable 2 milliGRAM(s) IV Push every 30 minutes PRN for CIWA > 8  magnesium hydroxide Suspension 30 milliLiter(s) Oral daily PRN Constipation  melatonin 3 milliGRAM(s) Oral at bedtime PRN Insomnia  morphine  - Injectable 2 milliGRAM(s) IV Push every 6 hours PRN Severe Pain (7 - 10)  ondansetron Injectable 4 milliGRAM(s) IV Push every 8 hours PRN Nausea and/or Vomiting  traMADol 50 milliGRAM(s) Oral three times a day PRN Moderate Pain (4 - 6)         Vitals log        ICU Vital Signs Last 24 Hrs  T(C): 36.7 (30 Oct 2023 20:49), Max: 36.8 (30 Oct 2023 13:57)  T(F): 98.1 (30 Oct 2023 20:49), Max: 98.2 (30 Oct 2023 13:57)  HR: 64 (30 Oct 2023 20:49) (59 - 66)  BP: 167/70 (30 Oct 2023 20:49) (155/68 - 198/77)  BP(mean): --  ABP: --  ABP(mean): --  RR: 18 (30 Oct 2023 20:49) (16 - 18)  SpO2: 94% (30 Oct 2023 20:49) (92% - 98%)    O2 Parameters below as of 30 Oct 2023 20:49  Patient On (Oxygen Delivery Method): room air                 Input and Output:  I&O's Detail      Lab Data                        12.8   4.55  )-----------( 196      ( 30 Oct 2023 09:35 )             39.4     10-30    142  |  107  |  11  ----------------------------<  120<H>  4.0   |  27  |  0.89    Ca    9.6      30 Oct 2023 09:35    TPro  7.5  /  Alb  4.1  /  TBili  0.8  /  DBili  x   /  AST  30  /  ALT  35  /  AlkPhos  91  10-30            Review of Systems	      Objective     Physical Examination    heart s1s2  lung dc BS  head nc      Pertinent Lab findings & Imaging      Madhuri:  NO   Adequate UO     I&O's Detail           Discussed with:     Cultures:	        Radiology

## 2023-11-01 LAB
ANION GAP SERPL CALC-SCNC: 9 MMOL/L — SIGNIFICANT CHANGE UP (ref 5–17)
ANION GAP SERPL CALC-SCNC: 9 MMOL/L — SIGNIFICANT CHANGE UP (ref 5–17)
BUN SERPL-MCNC: 18 MG/DL — SIGNIFICANT CHANGE UP (ref 7–23)
BUN SERPL-MCNC: 18 MG/DL — SIGNIFICANT CHANGE UP (ref 7–23)
CALCIUM SERPL-MCNC: 9.2 MG/DL — SIGNIFICANT CHANGE UP (ref 8.5–10.1)
CALCIUM SERPL-MCNC: 9.2 MG/DL — SIGNIFICANT CHANGE UP (ref 8.5–10.1)
CHLORIDE SERPL-SCNC: 105 MMOL/L — SIGNIFICANT CHANGE UP (ref 96–108)
CHLORIDE SERPL-SCNC: 105 MMOL/L — SIGNIFICANT CHANGE UP (ref 96–108)
CO2 SERPL-SCNC: 27 MMOL/L — SIGNIFICANT CHANGE UP (ref 22–31)
CO2 SERPL-SCNC: 27 MMOL/L — SIGNIFICANT CHANGE UP (ref 22–31)
CREAT SERPL-MCNC: 1.1 MG/DL — SIGNIFICANT CHANGE UP (ref 0.5–1.3)
CREAT SERPL-MCNC: 1.1 MG/DL — SIGNIFICANT CHANGE UP (ref 0.5–1.3)
EGFR: 74 ML/MIN/1.73M2 — SIGNIFICANT CHANGE UP
EGFR: 74 ML/MIN/1.73M2 — SIGNIFICANT CHANGE UP
GLUCOSE SERPL-MCNC: 233 MG/DL — HIGH (ref 70–99)
GLUCOSE SERPL-MCNC: 233 MG/DL — HIGH (ref 70–99)
HCT VFR BLD CALC: 34.1 % — LOW (ref 39–50)
HCT VFR BLD CALC: 34.1 % — LOW (ref 39–50)
HGB BLD-MCNC: 11.1 G/DL — LOW (ref 13–17)
HGB BLD-MCNC: 11.1 G/DL — LOW (ref 13–17)
MCHC RBC-ENTMCNC: 28.1 PG — SIGNIFICANT CHANGE UP (ref 27–34)
MCHC RBC-ENTMCNC: 28.1 PG — SIGNIFICANT CHANGE UP (ref 27–34)
MCHC RBC-ENTMCNC: 32.6 GM/DL — SIGNIFICANT CHANGE UP (ref 32–36)
MCHC RBC-ENTMCNC: 32.6 GM/DL — SIGNIFICANT CHANGE UP (ref 32–36)
MCV RBC AUTO: 86.3 FL — SIGNIFICANT CHANGE UP (ref 80–100)
MCV RBC AUTO: 86.3 FL — SIGNIFICANT CHANGE UP (ref 80–100)
NRBC # BLD: 0 /100 WBCS — SIGNIFICANT CHANGE UP (ref 0–0)
NRBC # BLD: 0 /100 WBCS — SIGNIFICANT CHANGE UP (ref 0–0)
PLATELET # BLD AUTO: 207 K/UL — SIGNIFICANT CHANGE UP (ref 150–400)
PLATELET # BLD AUTO: 207 K/UL — SIGNIFICANT CHANGE UP (ref 150–400)
POTASSIUM SERPL-MCNC: 3.6 MMOL/L — SIGNIFICANT CHANGE UP (ref 3.5–5.3)
POTASSIUM SERPL-MCNC: 3.6 MMOL/L — SIGNIFICANT CHANGE UP (ref 3.5–5.3)
POTASSIUM SERPL-SCNC: 3.6 MMOL/L — SIGNIFICANT CHANGE UP (ref 3.5–5.3)
POTASSIUM SERPL-SCNC: 3.6 MMOL/L — SIGNIFICANT CHANGE UP (ref 3.5–5.3)
RBC # BLD: 3.95 M/UL — LOW (ref 4.2–5.8)
RBC # BLD: 3.95 M/UL — LOW (ref 4.2–5.8)
RBC # FLD: 13.9 % — SIGNIFICANT CHANGE UP (ref 10.3–14.5)
RBC # FLD: 13.9 % — SIGNIFICANT CHANGE UP (ref 10.3–14.5)
SODIUM SERPL-SCNC: 141 MMOL/L — SIGNIFICANT CHANGE UP (ref 135–145)
SODIUM SERPL-SCNC: 141 MMOL/L — SIGNIFICANT CHANGE UP (ref 135–145)
WBC # BLD: 6.77 K/UL — SIGNIFICANT CHANGE UP (ref 3.8–10.5)
WBC # BLD: 6.77 K/UL — SIGNIFICANT CHANGE UP (ref 3.8–10.5)
WBC # FLD AUTO: 6.77 K/UL — SIGNIFICANT CHANGE UP (ref 3.8–10.5)
WBC # FLD AUTO: 6.77 K/UL — SIGNIFICANT CHANGE UP (ref 3.8–10.5)

## 2023-11-01 PROCEDURE — 73630 X-RAY EXAM OF FOOT: CPT | Mod: 26,LT

## 2023-11-01 RX ORDER — ENOXAPARIN SODIUM 100 MG/ML
40 INJECTION SUBCUTANEOUS EVERY 24 HOURS
Refills: 0 | Status: DISCONTINUED | OUTPATIENT
Start: 2023-11-02 | End: 2023-11-02

## 2023-11-01 RX ORDER — OXYCODONE AND ACETAMINOPHEN 5; 325 MG/1; MG/1
1 TABLET ORAL
Qty: 0 | Refills: 0 | DISCHARGE
Start: 2023-11-01

## 2023-11-01 RX ORDER — THIAMINE MONONITRATE (VIT B1) 100 MG
1 TABLET ORAL
Qty: 0 | Refills: 0 | DISCHARGE
Start: 2023-11-01

## 2023-11-01 RX ORDER — INSULIN GLARGINE 100 [IU]/ML
10 INJECTION, SOLUTION SUBCUTANEOUS AT BEDTIME
Refills: 0 | Status: DISCONTINUED | OUTPATIENT
Start: 2023-11-01 | End: 2023-11-02

## 2023-11-01 RX ORDER — LANOLIN ALCOHOL/MO/W.PET/CERES
1 CREAM (GRAM) TOPICAL
Qty: 0 | Refills: 0 | DISCHARGE
Start: 2023-11-01

## 2023-11-01 RX ORDER — FOLIC ACID 0.8 MG
1 TABLET ORAL
Qty: 30 | Refills: 0
Start: 2023-11-01 | End: 2023-11-30

## 2023-11-01 RX ORDER — ACETAMINOPHEN 500 MG
2 TABLET ORAL
Qty: 0 | Refills: 0 | DISCHARGE
Start: 2023-11-01

## 2023-11-01 RX ORDER — SENNA PLUS 8.6 MG/1
2 TABLET ORAL
Qty: 0 | Refills: 0 | DISCHARGE
Start: 2023-11-01

## 2023-11-01 RX ORDER — THIAMINE MONONITRATE (VIT B1) 100 MG
1 TABLET ORAL
Qty: 30 | Refills: 0
Start: 2023-11-01 | End: 2023-11-30

## 2023-11-01 RX ORDER — LACTOBACILLUS ACIDOPHILUS 100MM CELL
1 CAPSULE ORAL
Qty: 28 | Refills: 0
Start: 2023-11-01 | End: 2023-11-14

## 2023-11-01 RX ORDER — SENNA PLUS 8.6 MG/1
2 TABLET ORAL
Qty: 28 | Refills: 0
Start: 2023-11-01 | End: 2023-11-14

## 2023-11-01 RX ORDER — FOLIC ACID 0.8 MG
1 TABLET ORAL
Qty: 0 | Refills: 0 | DISCHARGE
Start: 2023-11-01

## 2023-11-01 RX ORDER — INSULIN LISPRO 100/ML
VIAL (ML) SUBCUTANEOUS AT BEDTIME
Refills: 0 | Status: DISCONTINUED | OUTPATIENT
Start: 2023-11-01 | End: 2023-11-02

## 2023-11-01 RX ORDER — CEPHALEXIN 500 MG
1 CAPSULE ORAL
Qty: 28 | Refills: 0
Start: 2023-11-01 | End: 2023-11-07

## 2023-11-01 RX ORDER — INSULIN LISPRO 100/ML
VIAL (ML) SUBCUTANEOUS
Refills: 0 | Status: DISCONTINUED | OUTPATIENT
Start: 2023-11-01 | End: 2023-11-02

## 2023-11-01 RX ADMIN — Medication 100 MILLIGRAM(S): at 05:55

## 2023-11-01 RX ADMIN — Medication 1 TABLET(S): at 11:17

## 2023-11-01 RX ADMIN — Medication 1 MILLIGRAM(S): at 11:17

## 2023-11-01 RX ADMIN — SENNA PLUS 2 TABLET(S): 8.6 TABLET ORAL at 22:05

## 2023-11-01 RX ADMIN — Medication 100 MILLIGRAM(S): at 22:06

## 2023-11-01 RX ADMIN — INSULIN GLARGINE 10 UNIT(S): 100 INJECTION, SOLUTION SUBCUTANEOUS at 22:06

## 2023-11-01 RX ADMIN — Medication 2: at 08:05

## 2023-11-01 RX ADMIN — Medication 2: at 17:11

## 2023-11-01 RX ADMIN — Medication 100 MILLIGRAM(S): at 13:42

## 2023-11-01 RX ADMIN — PANTOPRAZOLE SODIUM 40 MILLIGRAM(S): 20 TABLET, DELAYED RELEASE ORAL at 05:55

## 2023-11-01 RX ADMIN — Medication 100 MILLIGRAM(S): at 11:17

## 2023-11-01 RX ADMIN — Medication 50 MILLIGRAM(S): at 05:55

## 2023-11-01 RX ADMIN — Medication 4: at 12:17

## 2023-11-01 RX ADMIN — Medication 81 MILLIGRAM(S): at 11:17

## 2023-11-01 RX ADMIN — ENOXAPARIN SODIUM 40 MILLIGRAM(S): 100 INJECTION SUBCUTANEOUS at 05:55

## 2023-11-01 RX ADMIN — GABAPENTIN 300 MILLIGRAM(S): 400 CAPSULE ORAL at 17:11

## 2023-11-01 RX ADMIN — LOSARTAN POTASSIUM 100 MILLIGRAM(S): 100 TABLET, FILM COATED ORAL at 05:55

## 2023-11-01 RX ADMIN — GABAPENTIN 300 MILLIGRAM(S): 400 CAPSULE ORAL at 05:55

## 2023-11-01 RX ADMIN — ATORVASTATIN CALCIUM 80 MILLIGRAM(S): 80 TABLET, FILM COATED ORAL at 22:05

## 2023-11-01 NOTE — CARE COORDINATION ASSESSMENT. - OTHER PERTINENT DISCHARGE PLANNING INFORMATION:
Met with patient at bedside to discuss the role of case management with verbalized understanding.  Patient is POD 1 excision of bone from right heel.  Patient is recommended for HCPT.  Transition resources provided.  Patient is currently declining home care for PT or wound care due to 2 large dogs at residence that he is concerned about.  Patient states he will follow with wound care MD.

## 2023-11-01 NOTE — PROGRESS NOTE ADULT - SUBJECTIVE AND OBJECTIVE BOX
Date/Time Patient Seen:  		  Referring MD:   Data Reviewed	       Patient is a 68y old  Male who presents with a chief complaint of right heel pain (31 Oct 2023 15:37)      Subjective/HPI     PAST MEDICAL & SURGICAL HISTORY:  HTN (hypertension)    HLD (hyperlipidemia)    DM (diabetes mellitus)    Neuropathy    GERD (gastroesophageal reflux disease)          Medication list         MEDICATIONS  (STANDING):  aspirin enteric coated 81 milliGRAM(s) Oral daily  atorvastatin 80 milliGRAM(s) Oral at bedtime  BUpivacaine liposome 1.3% Injectable 20 milliLiter(s) Local Injection once  ceFAZolin   IVPB 2000 milliGRAM(s) IV Intermittent every 8 hours  dextrose 5%. 1000 milliLiter(s) (100 mL/Hr) IV Continuous <Continuous>  dextrose 5%. 1000 milliLiter(s) (50 mL/Hr) IV Continuous <Continuous>  dextrose 50% Injectable 25 Gram(s) IV Push once  dextrose 50% Injectable 25 Gram(s) IV Push once  dextrose 50% Injectable 12.5 Gram(s) IV Push once  enoxaparin Injectable 40 milliGRAM(s) SubCutaneous once  folic acid 1 milliGRAM(s) Oral daily  gabapentin 300 milliGRAM(s) Oral two times a day  glucagon  Injectable 1 milliGRAM(s) IntraMuscular once  insulin lispro (ADMELOG) corrective regimen sliding scale   SubCutaneous at bedtime  insulin lispro (ADMELOG) corrective regimen sliding scale   SubCutaneous three times a day before meals  losartan 100 milliGRAM(s) Oral daily  metoprolol succinate ER 50 milliGRAM(s) Oral daily  multivitamin 1 Tablet(s) Oral daily  pantoprazole    Tablet 40 milliGRAM(s) Oral before breakfast  senna 2 Tablet(s) Oral at bedtime  thiamine 100 milliGRAM(s) Oral daily    MEDICATIONS  (PRN):  aluminum hydroxide/magnesium hydroxide/simethicone Suspension 30 milliLiter(s) Oral every 4 hours PRN Dyspepsia  dextrose Oral Gel 15 Gram(s) Oral once PRN Blood Glucose LESS THAN 70 milliGRAM(s)/deciliter  LORazepam     Tablet 1 milliGRAM(s) Oral every 2 hours PRN for ciwa > 5  LORazepam   Injectable 2 milliGRAM(s) IV Push every 30 minutes PRN for CIWA > 8  magnesium hydroxide Suspension 30 milliLiter(s) Oral daily PRN Constipation  melatonin 3 milliGRAM(s) Oral at bedtime PRN Insomnia  morphine  - Injectable 2 milliGRAM(s) IV Push every 6 hours PRN Severe Pain (7 - 10)  ondansetron Injectable 4 milliGRAM(s) IV Push every 8 hours PRN Nausea and/or Vomiting  oxycodone    5 mG/acetaminophen 325 mG 1 Tablet(s) Oral every 4 hours PRN Moderate Pain (4 - 6)         Vitals log        ICU Vital Signs Last 24 Hrs  T(C): 36.8 (31 Oct 2023 20:35), Max: 37 (31 Oct 2023 10:36)  T(F): 98.2 (31 Oct 2023 20:35), Max: 98.6 (31 Oct 2023 10:36)  HR: 67 (31 Oct 2023 20:35) (59 - 73)  BP: 151/76 (31 Oct 2023 20:35) (132/63 - 160/75)  BP(mean): --  ABP: --  ABP(mean): --  RR: 18 (31 Oct 2023 20:35) (16 - 18)  SpO2: 96% (31 Oct 2023 20:35) (91% - 98%)    O2 Parameters below as of 31 Oct 2023 20:35  Patient On (Oxygen Delivery Method): nasal cannula                 Input and Output:  I&O's Detail    31 Oct 2023 07:01  -  01 Nov 2023 05:06  --------------------------------------------------------  IN:    IV PiggyBack: 50 mL  Total IN: 50 mL    OUT:  Total OUT: 0 mL    Total NET: 50 mL          Lab Data                        11.8   4.88  )-----------( 198      ( 31 Oct 2023 06:17 )             36.6     10-31    142  |  105  |  13  ----------------------------<  111<H>  4.1   |  28  |  0.96    Ca    9.4      31 Oct 2023 06:17    TPro  6.9  /  Alb  3.7  /  TBili  0.9  /  DBili  x   /  AST  24  /  ALT  32  /  AlkPhos  82  10-31            Review of Systems	      Objective     Physical Examination    heart s1s2  lung dec BS  head nc      Pertinent Lab findings & Imaging      Madhuri:  NO   Adequate UO     I&O's Detail    31 Oct 2023 07:01  -  01 Nov 2023 05:06  --------------------------------------------------------  IN:    IV PiggyBack: 50 mL  Total IN: 50 mL    OUT:  Total OUT: 0 mL    Total NET: 50 mL               Discussed with:     Cultures:	        Radiology

## 2023-11-01 NOTE — PROGRESS NOTE ADULT - SUBJECTIVE AND OBJECTIVE BOX
NOTE IN PROGRESS     SUBJECTIVE:  68-year-old male seen status post right calcaneal bone cyst excision (DOS: 10/31/2023).  Patient relates that he is doing well at this time and denies any other complaints.  Denies any fever, chills, nausea, vomiting, chest pain, shortness of breath, or calf pain at this time.    Allergies    Naprosyn (Hives; Rash)    Intolerances        MEDICATIONS  (STANDING):  aspirin enteric coated 81 milliGRAM(s) Oral daily  atorvastatin 80 milliGRAM(s) Oral at bedtime  BUpivacaine liposome 1.3% Injectable 20 milliLiter(s) Local Injection once  ceFAZolin   IVPB 2000 milliGRAM(s) IV Intermittent every 8 hours  dextrose 5%. 1000 milliLiter(s) (100 mL/Hr) IV Continuous <Continuous>  dextrose 5%. 1000 milliLiter(s) (50 mL/Hr) IV Continuous <Continuous>  dextrose 50% Injectable 25 Gram(s) IV Push once  dextrose 50% Injectable 25 Gram(s) IV Push once  dextrose 50% Injectable 12.5 Gram(s) IV Push once  enoxaparin Injectable 40 milliGRAM(s) SubCutaneous every 24 hours  folic acid 1 milliGRAM(s) Oral daily  gabapentin 300 milliGRAM(s) Oral two times a day  glucagon  Injectable 1 milliGRAM(s) IntraMuscular once  insulin glargine Injectable (LANTUS) 10 Unit(s) SubCutaneous at bedtime  insulin lispro (ADMELOG) corrective regimen sliding scale   SubCutaneous three times a day before meals  insulin lispro (ADMELOG) corrective regimen sliding scale   SubCutaneous at bedtime  losartan 100 milliGRAM(s) Oral daily  metoprolol succinate ER 50 milliGRAM(s) Oral daily  multivitamin 1 Tablet(s) Oral daily  pantoprazole    Tablet 40 milliGRAM(s) Oral before breakfast  senna 2 Tablet(s) Oral at bedtime  thiamine 100 milliGRAM(s) Oral daily    MEDICATIONS  (PRN):  aluminum hydroxide/magnesium hydroxide/simethicone Suspension 30 milliLiter(s) Oral every 4 hours PRN Dyspepsia  dextrose Oral Gel 15 Gram(s) Oral once PRN Blood Glucose LESS THAN 70 milliGRAM(s)/deciliter  LORazepam     Tablet 1 milliGRAM(s) Oral every 2 hours PRN for ciwa > 5  LORazepam   Injectable 2 milliGRAM(s) IV Push every 30 minutes PRN for CIWA > 8  magnesium hydroxide Suspension 30 milliLiter(s) Oral daily PRN Constipation  melatonin 3 milliGRAM(s) Oral at bedtime PRN Insomnia  morphine  - Injectable 2 milliGRAM(s) IV Push every 6 hours PRN Severe Pain (7 - 10)  ondansetron Injectable 4 milliGRAM(s) IV Push every 8 hours PRN Nausea and/or Vomiting  oxycodone    5 mG/acetaminophen 325 mG 1 Tablet(s) Oral every 4 hours PRN Moderate Pain (4 - 6)      Vital Signs Last 24 Hrs  T(C): 37.2 (02 Nov 2023 07:01), Max: 37.2 (02 Nov 2023 07:01)  T(F): 98.9 (02 Nov 2023 07:01), Max: 98.9 (02 Nov 2023 07:01)  HR: 65 (02 Nov 2023 07:01) (59 - 65)  BP: 144/64 (02 Nov 2023 07:01) (119/61 - 182/80)  BP(mean): --  RR: 18 (02 Nov 2023 07:01) (18 - 18)  SpO2: 95% (02 Nov 2023 07:01) (90% - 95%)    Parameters below as of 02 Nov 2023 07:01  Patient On (Oxygen Delivery Method): room air        PHYSICAL EXAM:  Right lower extremity dressing and splint intact, no strikethrough noted at this time.Capillary refill is 2 seconds to all digits of the right foot                        11.0   7.61  )-----------( 197      ( 02 Nov 2023 06:38 )             33.8       11-02    143  |  106  |  14  ----------------------------<  142<H>  3.5   |  28  |  1.00    Ca    9.2      02 Nov 2023 06:38                Imaging: s/p right calcaneal bone cyst excision and application of cancellous bone graft

## 2023-11-01 NOTE — CONSULT NOTE ADULT - SUBJECTIVE AND OBJECTIVE BOX
Patient is a 68y old  Male who presents with a chief complaint of right heel pain (01 Nov 2023 05:06)      Reason For Consult: dm2 uncontrolled    HPI:  67 yo M PMHx HTN, HLD, DM, GERD sent to ED by Dr. 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 Pt c/o chronic heel pain x weeks, otherwise feeling ok . Pt denies fever/chills, numbness/tingling. Surgery planned for tomorrow and card clearance requested Addiction medicine cons called due to ETOH addiction hx ( patient drinks 5 beers a day , quit smoking 12 yrs ago ,patient  used to drink and smoke much more as per wife ) (30 Oct 2023 12:02)      PAST MEDICAL & SURGICAL HISTORY:  HTN (hypertension)      HLD (hyperlipidemia)      DM (diabetes mellitus)      Neuropathy      GERD (gastroesophageal reflux disease)          FAMILY HISTORY:        Social History:    MEDICATIONS  (STANDING):  aspirin enteric coated 81 milliGRAM(s) Oral daily  atorvastatin 80 milliGRAM(s) Oral at bedtime  BUpivacaine liposome 1.3% Injectable 20 milliLiter(s) Local Injection once  ceFAZolin   IVPB 2000 milliGRAM(s) IV Intermittent every 8 hours  dextrose 5%. 1000 milliLiter(s) (100 mL/Hr) IV Continuous <Continuous>  dextrose 5%. 1000 milliLiter(s) (50 mL/Hr) IV Continuous <Continuous>  dextrose 50% Injectable 25 Gram(s) IV Push once  dextrose 50% Injectable 12.5 Gram(s) IV Push once  dextrose 50% Injectable 25 Gram(s) IV Push once  folic acid 1 milliGRAM(s) Oral daily  gabapentin 300 milliGRAM(s) Oral two times a day  glucagon  Injectable 1 milliGRAM(s) IntraMuscular once  insulin lispro (ADMELOG) corrective regimen sliding scale   SubCutaneous at bedtime  insulin lispro (ADMELOG) corrective regimen sliding scale   SubCutaneous three times a day before meals  losartan 100 milliGRAM(s) Oral daily  metoprolol succinate ER 50 milliGRAM(s) Oral daily  multivitamin 1 Tablet(s) Oral daily  pantoprazole    Tablet 40 milliGRAM(s) Oral before breakfast  senna 2 Tablet(s) Oral at bedtime  thiamine 100 milliGRAM(s) Oral daily    MEDICATIONS  (PRN):  aluminum hydroxide/magnesium hydroxide/simethicone Suspension 30 milliLiter(s) Oral every 4 hours PRN Dyspepsia  dextrose Oral Gel 15 Gram(s) Oral once PRN Blood Glucose LESS THAN 70 milliGRAM(s)/deciliter  LORazepam     Tablet 1 milliGRAM(s) Oral every 2 hours PRN for ciwa > 5  LORazepam   Injectable 2 milliGRAM(s) IV Push every 30 minutes PRN for CIWA > 8  magnesium hydroxide Suspension 30 milliLiter(s) Oral daily PRN Constipation  melatonin 3 milliGRAM(s) Oral at bedtime PRN Insomnia  morphine  - Injectable 2 milliGRAM(s) IV Push every 6 hours PRN Severe Pain (7 - 10)  ondansetron Injectable 4 milliGRAM(s) IV Push every 8 hours PRN Nausea and/or Vomiting  oxycodone    5 mG/acetaminophen 325 mG 1 Tablet(s) Oral every 4 hours PRN Moderate Pain (4 - 6)        T(C): 36.6 (11-01-23 @ 05:00), Max: 36.8 (10-31-23 @ 12:16)  HR: 59 (11-01-23 @ 05:00) (59 - 67)  BP: 164/80 (11-01-23 @ 05:00) (133/56 - 164/80)  RR: 18 (11-01-23 @ 05:00) (18 - 18)  SpO2: 95% (11-01-23 @ 05:00) (95% - 96%)  Wt(kg): --    PHYSICAL EXAM:  GENERAL: NAD, well-groomed, well-developed  HEAD:  Atraumatic, Normocephalic  NECK: Supple, No JVD, Normal thyroid  CHEST/LUNG: Clear to percussion bilaterally; No rales, rhonchi, wheezing, or rubs  HEART: Regular rate and rhythm; No murmurs, rubs, or gallops  ABDOMEN: Soft, Nontender, Nondistended; Bowel sounds present  EXTREMITIES:  le foot dsg intact    CAPILLARY BLOOD GLUCOSE      POCT Blood Glucose.: 216 mg/dL (01 Nov 2023 07:54)  POCT Blood Glucose.: 250 mg/dL (31 Oct 2023 20:59)  POCT Blood Glucose.: 219 mg/dL (31 Oct 2023 17:11)  POCT Blood Glucose.: 145 mg/dL (31 Oct 2023 12:11)                            11.1   6.77  )-----------( 207      ( 01 Nov 2023 06:05 )             34.1       CMP:  11-01 @ 06:05  SGPT --  Albumin --   Alk Phos --   Anion Gap 9   SGOT --   Total Bili --   BUN 18   Calcium Total 9.2   CO2 27   Chloride 105   Creatinine 1.10   eGFR if AA --   eGFR if non AA --   Glucose 233   Potassium 3.6   Protein --   Sodium 141      Thyroid Function Tests:      Diabetes Tests:       Radiology:

## 2023-11-01 NOTE — PROGRESS NOTE ADULT - SUBJECTIVE AND OBJECTIVE BOX
Chief Complaint: Foot surgery    Interval Events: No events overnight.    Review of Systems:  General: No fevers, chills, weight gain  Skin: No rashes, color changes  Cardiovascular: No chest pain, orthopnea  Respiratory: No shortness of breath, cough  Gastrointestinal: No nausea, abdominal pain  Genitourinary: No incontinence, pain with urination  Musculoskeletal: No pain, swelling, decreased range of motion  Neurological: No headache, weakness  Psychiatric: No depression, anxiety  Endocrine: No weight gain, increased thirst  All other systems are comprehensively negative.    Physical Exam:  Vital Signs Last 24 Hrs  T(C): 36.6 (01 Nov 2023 05:00), Max: 37 (31 Oct 2023 10:36)  T(F): 97.9 (01 Nov 2023 05:00), Max: 98.6 (31 Oct 2023 10:36)  HR: 59 (01 Nov 2023 05:00) (59 - 73)  BP: 164/80 (01 Nov 2023 05:00) (132/63 - 164/80)  BP(mean): --  RR: 18 (01 Nov 2023 05:00) (16 - 18)  SpO2: 95% (01 Nov 2023 05:00) (95% - 98%)  Parameters below as of 01 Nov 2023 05:00  Patient On (Oxygen Delivery Method): nasal cannula  O2 Flow (L/min): 2  General: NAD  HEENT: MMM  Neck: No JVD, no carotid bruit  Lungs: CTAB  CV: RRR, nl S1/S2, no M/R/G  Abdomen: S/NT/ND, +BS  Extremities: No LE edema, no cyanosis  Neuro: AAOx3, non-focal  Skin: No rash    Labs:    11-01    141  |  105  |  18  ----------------------------<  233<H>  3.6   |  27  |  1.10    Ca    9.2      01 Nov 2023 06:05    TPro  6.9  /  Alb  3.7  /  TBili  0.9  /  DBili  x   /  AST  24  /  ALT  32  /  AlkPhos  82  10-31                        11.1   6.77  )-----------( 207      ( 01 Nov 2023 06:05 )             34.1

## 2023-11-01 NOTE — CARE COORDINATION ASSESSMENT. - NS SW HOME EQUIPMENT
Patient states there is a walker at residence.  It used to be his wifes, but she does not use it/walker

## 2023-11-01 NOTE — CARE COORDINATION ASSESSMENT. - NSPASTMEDSURGHISTORY_GEN_ALL_CORE_FT
PAST MEDICAL & SURGICAL HISTORY:  HLD (hyperlipidemia)      HTN (hypertension)      GERD (gastroesophageal reflux disease)      Neuropathy      DM (diabetes mellitus)

## 2023-11-01 NOTE — PROGRESS NOTE ADULT - SUBJECTIVE AND OBJECTIVE BOX
PROGRESS NOTE  Patient is a 68y old  Male who presents with a chief complaint of right heel pain (01 Nov 2023 11:15)  Chart and available morning labs /imaging are reviewed electronically , urgent issues addressed . More information  is being added upon completion of rounds , when more information is collected and management discussed with consultants , medical staff and social service/case management on the floor   OVERNIGHT  No new issues reported by medical staff . All above noted Patient is resting in a bed comfortably .Pain is well controlled ,refuses home pt , d/w sw , and Stevenson Goodman .No distress noted   HPI:  69 yo M PMHx HTN, HLD, DM, GERD sent to ED by Dr. 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 Pt c/o chronic heel pain x weeks, otherwise feeling ok . Pt denies fever/chills, numbness/tingling. Surgery planned for tomorrow and card clearance requested Addiction medicine cons called due to ETOH addiction hx ( patient drinks 5 beers a day , quit smoking 12 yrs ago ,patient  used to drink and smoke much more as per wife ) (30 Oct 2023 12:02)    PAST MEDICAL & SURGICAL HISTORY:  HTN (hypertension)      HLD (hyperlipidemia)      DM (diabetes mellitus)      Neuropathy      GERD (gastroesophageal reflux disease)          MEDICATIONS  (STANDING):  aspirin enteric coated 81 milliGRAM(s) Oral daily  atorvastatin 80 milliGRAM(s) Oral at bedtime  BUpivacaine liposome 1.3% Injectable 20 milliLiter(s) Local Injection once  ceFAZolin   IVPB 2000 milliGRAM(s) IV Intermittent every 8 hours  dextrose 5%. 1000 milliLiter(s) (100 mL/Hr) IV Continuous <Continuous>  dextrose 5%. 1000 milliLiter(s) (50 mL/Hr) IV Continuous <Continuous>  dextrose 50% Injectable 25 Gram(s) IV Push once  dextrose 50% Injectable 25 Gram(s) IV Push once  dextrose 50% Injectable 12.5 Gram(s) IV Push once  folic acid 1 milliGRAM(s) Oral daily  gabapentin 300 milliGRAM(s) Oral two times a day  glucagon  Injectable 1 milliGRAM(s) IntraMuscular once  insulin glargine Injectable (LANTUS) 10 Unit(s) SubCutaneous at bedtime  insulin lispro (ADMELOG) corrective regimen sliding scale   SubCutaneous three times a day before meals  insulin lispro (ADMELOG) corrective regimen sliding scale   SubCutaneous at bedtime  losartan 100 milliGRAM(s) Oral daily  metoprolol succinate ER 50 milliGRAM(s) Oral daily  multivitamin 1 Tablet(s) Oral daily  pantoprazole    Tablet 40 milliGRAM(s) Oral before breakfast  senna 2 Tablet(s) Oral at bedtime  thiamine 100 milliGRAM(s) Oral daily    MEDICATIONS  (PRN):  aluminum hydroxide/magnesium hydroxide/simethicone Suspension 30 milliLiter(s) Oral every 4 hours PRN Dyspepsia  dextrose Oral Gel 15 Gram(s) Oral once PRN Blood Glucose LESS THAN 70 milliGRAM(s)/deciliter  LORazepam     Tablet 1 milliGRAM(s) Oral every 2 hours PRN for ciwa > 5  LORazepam   Injectable 2 milliGRAM(s) IV Push every 30 minutes PRN for CIWA > 8  magnesium hydroxide Suspension 30 milliLiter(s) Oral daily PRN Constipation  melatonin 3 milliGRAM(s) Oral at bedtime PRN Insomnia  morphine  - Injectable 2 milliGRAM(s) IV Push every 6 hours PRN Severe Pain (7 - 10)  ondansetron Injectable 4 milliGRAM(s) IV Push every 8 hours PRN Nausea and/or Vomiting  oxycodone    5 mG/acetaminophen 325 mG 1 Tablet(s) Oral every 4 hours PRN Moderate Pain (4 - 6)      OBJECTIVE    T(C): 36.6 (11-01-23 @ 05:00), Max: 36.8 (10-31-23 @ 12:16)  HR: 59 (11-01-23 @ 05:00) (59 - 67)  BP: 164/80 (11-01-23 @ 05:00) (146/80 - 164/80)  RR: 18 (11-01-23 @ 05:00) (18 - 18)  SpO2: 95% (11-01-23 @ 05:00) (95% - 96%)  Wt(kg): --  I&O's Summary    31 Oct 2023 07:01  -  01 Nov 2023 07:00  --------------------------------------------------------  IN: 50 mL / OUT: 0 mL / NET: 50 mL    01 Nov 2023 07:01  -  01 Nov 2023 11:54  --------------------------------------------------------  IN: 0 mL / OUT: 400 mL / NET: -400 mL          REVIEW OF SYSTEMS:  CONSTITUTIONAL: No fever, weight loss, or fatigue  EYES: No eye pain, visual disturbances, or discharge  ENMT:   No sinus or throat pain  NECK: No pain or stiffness  RESPIRATORY: No cough, wheezing, chills or hemoptysis; No shortness of breath  CARDIOVASCULAR: No chest pain, palpitations, dizziness, or leg swelling  GASTROINTESTINAL: No abdominal pain. No nausea, vomiting; No diarrhea or constipation. No melena or hematochezia.  GENITOURINARY: No dysuria, frequency, hematuria, or incontinence  NEUROLOGICAL: No headaches, memory loss, loss of strength, numbness, or tremors  SKIN: No itching, burning, rashes, or lesions   MUSCULOSKELETAL: No joint pain or swelling; No muscle, back, or extremity pain    PHYSICAL EXAM:  Appearance: NAD. VS past 24 hrs -as above   HEENT:   Moist oral mucosa. Conjunctiva clear b/l.   Neck : supple  Respiratory: Lungs CTAB.  Gastrointestinal:  Soft, nontender. No rebound. No rigidity. BS present	  Cardiovascular: RRR ,S1S2 present  Neurologic: Non-focal. Moving all extremities.  Extremities: No edema. No erythema. No calf tenderness.  Skin: No rashes, No ecchymoses, No cyanosis.	  wounds ,skin lesions-See skin assesment flow sheet   LABS:                        11.1   6.77  )-----------( 207      ( 01 Nov 2023 06:05 )             34.1     11-01    141  |  105  |  18  ----------------------------<  233<H>  3.6   |  27  |  1.10    Ca    9.2      01 Nov 2023 06:05    TPro  6.9  /  Alb  3.7  /  TBili  0.9  /  DBili  x   /  AST  24  /  ALT  32  /  AlkPhos  82  10-31    CAPILLARY BLOOD GLUCOSE      POCT Blood Glucose.: 230 mg/dL (01 Nov 2023 11:47)  POCT Blood Glucose.: 216 mg/dL (01 Nov 2023 07:54)  POCT Blood Glucose.: 250 mg/dL (31 Oct 2023 20:59)  POCT Blood Glucose.: 219 mg/dL (31 Oct 2023 17:11)  POCT Blood Glucose.: 145 mg/dL (31 Oct 2023 12:11)    PT/INR - ( 31 Oct 2023 06:17 )   PT: 11.5 sec;   INR: 0.98 ratio           Urinalysis Basic - ( 01 Nov 2023 06:05 )    Color: x / Appearance: x / SG: x / pH: x  Gluc: 233 mg/dL / Ketone: x  / Bili: x / Urobili: x   Blood: x / Protein: x / Nitrite: x   Leuk Esterase: x / RBC: x / WBC x   Sq Epi: x / Non Sq Epi: x / Bacteria: x        RADIOLOGY & ADDITIONAL TESTS:   reviewed elctronically  ASSESSMENT/PLAN: 	    25 minutes aggregate time was spent on this visit, 50% visit time spent in care co-ordination with other attendings and counselling patient .I have discussed care plan with patient / HCP/family member ,who expressed understanding of problems treatment and their effect and side effects, alternatives in details. I have asked if they have any questions and concerns and appropriately addressed them to best of my ability.

## 2023-11-01 NOTE — CONSULT NOTE ADULT - CONSULT REASON
Evaluation for R heel tumor
Pre-operative evaluation, HTN
Right Heel Pain
68y A1C with Estimated Average Glucose Result: 9.5 % (10-31-23 @ 06:17)  A1C with Estimated Average Glucose Result: 9.2 % (10-30-23 @ 09:35)   diabetes mellitus uncontrolled type 2
AMINTA  Etoh use  Tara op
dm2 uncontrolled

## 2023-11-01 NOTE — CONSULT NOTE ADULT - PROBLEM SELECTOR RECOMMENDATION 9
change mod dose admelog corrective scale coverage qac/qhs  to resume outpatient antidiabetes regimen upon d/c  add lantus 10 units qhs  goal bg 100-180 in hosp setting  pt to f/u with outpatient endocrinologist  cont cons cho diet

## 2023-11-01 NOTE — CARE COORDINATION ASSESSMENT. - NSCAREPROVIDERS_GEN_ALL_CORE_FT
CARE PROVIDERS:  Accepting Physician: Yani Walter  Administration: Jerald Booker  Administration: Jarrett Arroyo  Admitting: Yani Walter  Attending: Yani Walter  Case Management: Mich Guardado  Consultant: Weil, Patricia  Consultant: Rick Ross  Consultant: Dank Salinas  Consultant: Cesar Campbell  Consultant: Elton Farfan  Consultant: Kieran Blue  ED ACP: Theresa Vidal  ED Attending: Meenakshi Lock  ED Nurse: Cinda Luna  HIM/Billing & Coding: Adriane Mcmahon  Nurse: Joya Patterson  Nurse: Anny Mccoy  Nurse: Odilia Brown  Ordered: Doctor, Unknown  Ordered: ADM, User  Ordered: ServiceAccount, SCMMLM  Ordered: ServiceAccount, SCMMLM  PCA/Nursing Assistant: Miriam Chang  PCA/Nursing Assistant: Teresa Guzmán  Physical Therapy: Maxine Diamond  Physical Therapy: Amauri Boyer  Primary Team: Yuly Coronado  Primary Team: Cali Potter  Registered Dietitian: Nohemi Gonzalez

## 2023-11-01 NOTE — CONSULT NOTE ADULT - CONSULT REQUESTED DATE/TIME
30-Oct-2023 15:36
30-Oct-2023
30-Oct-2023 14:23
31-Oct-2023 15:37
01-Nov-2023 11:15
30-Oct-2023 12:15

## 2023-11-02 ENCOUNTER — TRANSCRIPTION ENCOUNTER (OUTPATIENT)
Age: 68
End: 2023-11-02

## 2023-11-02 VITALS
DIASTOLIC BLOOD PRESSURE: 65 MMHG | OXYGEN SATURATION: 92 % | TEMPERATURE: 99 F | HEART RATE: 68 BPM | SYSTOLIC BLOOD PRESSURE: 143 MMHG | RESPIRATION RATE: 18 BRPM

## 2023-11-02 PROBLEM — K21.9 GASTRO-ESOPHAGEAL REFLUX DISEASE WITHOUT ESOPHAGITIS: Chronic | Status: ACTIVE | Noted: 2023-10-30

## 2023-11-02 PROBLEM — G62.9 POLYNEUROPATHY, UNSPECIFIED: Chronic | Status: ACTIVE | Noted: 2023-10-30

## 2023-11-02 PROBLEM — E11.9 TYPE 2 DIABETES MELLITUS WITHOUT COMPLICATIONS: Chronic | Status: ACTIVE | Noted: 2023-10-30

## 2023-11-02 LAB
ANION GAP SERPL CALC-SCNC: 9 MMOL/L — SIGNIFICANT CHANGE UP (ref 5–17)
ANION GAP SERPL CALC-SCNC: 9 MMOL/L — SIGNIFICANT CHANGE UP (ref 5–17)
BUN SERPL-MCNC: 14 MG/DL — SIGNIFICANT CHANGE UP (ref 7–23)
BUN SERPL-MCNC: 14 MG/DL — SIGNIFICANT CHANGE UP (ref 7–23)
CALCIUM SERPL-MCNC: 9.2 MG/DL — SIGNIFICANT CHANGE UP (ref 8.5–10.1)
CALCIUM SERPL-MCNC: 9.2 MG/DL — SIGNIFICANT CHANGE UP (ref 8.5–10.1)
CHLORIDE SERPL-SCNC: 106 MMOL/L — SIGNIFICANT CHANGE UP (ref 96–108)
CHLORIDE SERPL-SCNC: 106 MMOL/L — SIGNIFICANT CHANGE UP (ref 96–108)
CO2 SERPL-SCNC: 28 MMOL/L — SIGNIFICANT CHANGE UP (ref 22–31)
CO2 SERPL-SCNC: 28 MMOL/L — SIGNIFICANT CHANGE UP (ref 22–31)
CREAT SERPL-MCNC: 1 MG/DL — SIGNIFICANT CHANGE UP (ref 0.5–1.3)
CREAT SERPL-MCNC: 1 MG/DL — SIGNIFICANT CHANGE UP (ref 0.5–1.3)
EGFR: 82 ML/MIN/1.73M2 — SIGNIFICANT CHANGE UP
EGFR: 82 ML/MIN/1.73M2 — SIGNIFICANT CHANGE UP
GLUCOSE SERPL-MCNC: 142 MG/DL — HIGH (ref 70–99)
GLUCOSE SERPL-MCNC: 142 MG/DL — HIGH (ref 70–99)
HCT VFR BLD CALC: 33.8 % — LOW (ref 39–50)
HCT VFR BLD CALC: 33.8 % — LOW (ref 39–50)
HGB BLD-MCNC: 11 G/DL — LOW (ref 13–17)
HGB BLD-MCNC: 11 G/DL — LOW (ref 13–17)
MCHC RBC-ENTMCNC: 28.5 PG — SIGNIFICANT CHANGE UP (ref 27–34)
MCHC RBC-ENTMCNC: 28.5 PG — SIGNIFICANT CHANGE UP (ref 27–34)
MCHC RBC-ENTMCNC: 32.5 GM/DL — SIGNIFICANT CHANGE UP (ref 32–36)
MCHC RBC-ENTMCNC: 32.5 GM/DL — SIGNIFICANT CHANGE UP (ref 32–36)
MCV RBC AUTO: 87.6 FL — SIGNIFICANT CHANGE UP (ref 80–100)
MCV RBC AUTO: 87.6 FL — SIGNIFICANT CHANGE UP (ref 80–100)
NRBC # BLD: 0 /100 WBCS — SIGNIFICANT CHANGE UP (ref 0–0)
NRBC # BLD: 0 /100 WBCS — SIGNIFICANT CHANGE UP (ref 0–0)
PLATELET # BLD AUTO: 197 K/UL — SIGNIFICANT CHANGE UP (ref 150–400)
PLATELET # BLD AUTO: 197 K/UL — SIGNIFICANT CHANGE UP (ref 150–400)
POTASSIUM SERPL-MCNC: 3.5 MMOL/L — SIGNIFICANT CHANGE UP (ref 3.5–5.3)
POTASSIUM SERPL-MCNC: 3.5 MMOL/L — SIGNIFICANT CHANGE UP (ref 3.5–5.3)
POTASSIUM SERPL-SCNC: 3.5 MMOL/L — SIGNIFICANT CHANGE UP (ref 3.5–5.3)
POTASSIUM SERPL-SCNC: 3.5 MMOL/L — SIGNIFICANT CHANGE UP (ref 3.5–5.3)
RBC # BLD: 3.86 M/UL — LOW (ref 4.2–5.8)
RBC # BLD: 3.86 M/UL — LOW (ref 4.2–5.8)
RBC # FLD: 13.8 % — SIGNIFICANT CHANGE UP (ref 10.3–14.5)
RBC # FLD: 13.8 % — SIGNIFICANT CHANGE UP (ref 10.3–14.5)
SODIUM SERPL-SCNC: 143 MMOL/L — SIGNIFICANT CHANGE UP (ref 135–145)
SODIUM SERPL-SCNC: 143 MMOL/L — SIGNIFICANT CHANGE UP (ref 135–145)
WBC # BLD: 7.61 K/UL — SIGNIFICANT CHANGE UP (ref 3.8–10.5)
WBC # BLD: 7.61 K/UL — SIGNIFICANT CHANGE UP (ref 3.8–10.5)
WBC # FLD AUTO: 7.61 K/UL — SIGNIFICANT CHANGE UP (ref 3.8–10.5)
WBC # FLD AUTO: 7.61 K/UL — SIGNIFICANT CHANGE UP (ref 3.8–10.5)

## 2023-11-02 RX ADMIN — Medication 100 MILLIGRAM(S): at 05:35

## 2023-11-02 RX ADMIN — Medication 1 MILLIGRAM(S): at 12:21

## 2023-11-02 RX ADMIN — Medication 2: at 12:02

## 2023-11-02 RX ADMIN — Medication 81 MILLIGRAM(S): at 12:21

## 2023-11-02 RX ADMIN — PANTOPRAZOLE SODIUM 40 MILLIGRAM(S): 20 TABLET, DELAYED RELEASE ORAL at 07:12

## 2023-11-02 RX ADMIN — GABAPENTIN 300 MILLIGRAM(S): 400 CAPSULE ORAL at 05:35

## 2023-11-02 RX ADMIN — Medication 100 MILLIGRAM(S): at 12:21

## 2023-11-02 RX ADMIN — Medication 1 TABLET(S): at 12:21

## 2023-11-02 RX ADMIN — LOSARTAN POTASSIUM 100 MILLIGRAM(S): 100 TABLET, FILM COATED ORAL at 05:35

## 2023-11-02 RX ADMIN — Medication 50 MILLIGRAM(S): at 05:35

## 2023-11-02 RX ADMIN — Medication 6: at 08:12

## 2023-11-02 NOTE — PROGRESS NOTE ADULT - PROBLEM SELECTOR PLAN 6
continue home medications -gabapentin

## 2023-11-02 NOTE — PROGRESS NOTE ADULT - SUBJECTIVE AND OBJECTIVE BOX
Date/Time Patient Seen:  		  Referring MD:   Data Reviewed	       Patient is a 68y old  Male who presents with a chief complaint of right heel pain (01 Nov 2023 18:13)      Subjective/HPI     PAST MEDICAL & SURGICAL HISTORY:  HTN (hypertension)    HLD (hyperlipidemia)    DM (diabetes mellitus)    Neuropathy    GERD (gastroesophageal reflux disease)          Medication list         MEDICATIONS  (STANDING):  aspirin enteric coated 81 milliGRAM(s) Oral daily  atorvastatin 80 milliGRAM(s) Oral at bedtime  BUpivacaine liposome 1.3% Injectable 20 milliLiter(s) Local Injection once  ceFAZolin   IVPB 2000 milliGRAM(s) IV Intermittent every 8 hours  dextrose 5%. 1000 milliLiter(s) (100 mL/Hr) IV Continuous <Continuous>  dextrose 5%. 1000 milliLiter(s) (50 mL/Hr) IV Continuous <Continuous>  dextrose 50% Injectable 25 Gram(s) IV Push once  dextrose 50% Injectable 12.5 Gram(s) IV Push once  dextrose 50% Injectable 25 Gram(s) IV Push once  enoxaparin Injectable 40 milliGRAM(s) SubCutaneous every 24 hours  folic acid 1 milliGRAM(s) Oral daily  gabapentin 300 milliGRAM(s) Oral two times a day  glucagon  Injectable 1 milliGRAM(s) IntraMuscular once  insulin glargine Injectable (LANTUS) 10 Unit(s) SubCutaneous at bedtime  insulin lispro (ADMELOG) corrective regimen sliding scale   SubCutaneous at bedtime  insulin lispro (ADMELOG) corrective regimen sliding scale   SubCutaneous three times a day before meals  losartan 100 milliGRAM(s) Oral daily  metoprolol succinate ER 50 milliGRAM(s) Oral daily  multivitamin 1 Tablet(s) Oral daily  pantoprazole    Tablet 40 milliGRAM(s) Oral before breakfast  senna 2 Tablet(s) Oral at bedtime  thiamine 100 milliGRAM(s) Oral daily    MEDICATIONS  (PRN):  aluminum hydroxide/magnesium hydroxide/simethicone Suspension 30 milliLiter(s) Oral every 4 hours PRN Dyspepsia  dextrose Oral Gel 15 Gram(s) Oral once PRN Blood Glucose LESS THAN 70 milliGRAM(s)/deciliter  LORazepam     Tablet 1 milliGRAM(s) Oral every 2 hours PRN for ciwa > 5  LORazepam   Injectable 2 milliGRAM(s) IV Push every 30 minutes PRN for CIWA > 8  magnesium hydroxide Suspension 30 milliLiter(s) Oral daily PRN Constipation  melatonin 3 milliGRAM(s) Oral at bedtime PRN Insomnia  morphine  - Injectable 2 milliGRAM(s) IV Push every 6 hours PRN Severe Pain (7 - 10)  ondansetron Injectable 4 milliGRAM(s) IV Push every 8 hours PRN Nausea and/or Vomiting  oxycodone    5 mG/acetaminophen 325 mG 1 Tablet(s) Oral every 4 hours PRN Moderate Pain (4 - 6)         Vitals log        ICU Vital Signs Last 24 Hrs  T(C): 37 (01 Nov 2023 21:04), Max: 37 (01 Nov 2023 21:04)  T(F): 98.6 (01 Nov 2023 21:04), Max: 98.6 (01 Nov 2023 21:04)  HR: 61 (01 Nov 2023 21:04) (59 - 61)  BP: 136/45 (01 Nov 2023 21:04) (119/61 - 182/80)  BP(mean): --  ABP: --  ABP(mean): --  RR: 18 (01 Nov 2023 21:04) (18 - 18)  SpO2: 90% (01 Nov 2023 21:04) (90% - 93%)    O2 Parameters below as of 01 Nov 2023 21:04  Patient On (Oxygen Delivery Method): room air                 Input and Output:  I&O's Detail    31 Oct 2023 07:01  -  01 Nov 2023 07:00  --------------------------------------------------------  IN:    IV PiggyBack: 50 mL  Total IN: 50 mL    OUT:  Total OUT: 0 mL    Total NET: 50 mL      01 Nov 2023 07:01  -  02 Nov 2023 05:05  --------------------------------------------------------  IN:  Total IN: 0 mL    OUT:    Voided (mL): 400 mL  Total OUT: 400 mL    Total NET: -400 mL          Lab Data                        11.1   6.77  )-----------( 207      ( 01 Nov 2023 06:05 )             34.1     11-01    141  |  105  |  18  ----------------------------<  233<H>  3.6   |  27  |  1.10    Ca    9.2      01 Nov 2023 06:05    TPro  6.9  /  Alb  3.7  /  TBili  0.9  /  DBili  x   /  AST  24  /  ALT  32  /  AlkPhos  82  10-31            Review of Systems	      Objective     Physical Examination    heart s1s2  lung dec bS  head nc      Pertinent Lab findings & Imaging      Madhuri:  NO   Adequate UO     I&O's Detail    31 Oct 2023 07:01  -  01 Nov 2023 07:00  --------------------------------------------------------  IN:    IV PiggyBack: 50 mL  Total IN: 50 mL    OUT:  Total OUT: 0 mL    Total NET: 50 mL      01 Nov 2023 07:01  -  02 Nov 2023 05:05  --------------------------------------------------------  IN:  Total IN: 0 mL    OUT:    Voided (mL): 400 mL  Total OUT: 400 mL    Total NET: -400 mL               Discussed with:     Cultures:	        Radiology

## 2023-11-02 NOTE — PROGRESS NOTE ADULT - PROBLEM SELECTOR PLAN 2
Accu-Cheks monitoring and insulin corrective regimen  sliding scale coverage with short acting insulin, add long-acting insulin as needed ,no concentrated sweets diet, serial labs ,HbA1C,education

## 2023-11-02 NOTE — PROGRESS NOTE ADULT - PROBLEM SELECTOR PLAN 3
continue home medications ,cardiac clearance requested

## 2023-11-02 NOTE — PROGRESS NOTE ADULT - ASSESSMENT
69 yo M PMHx HTN, HLD, DM, GERD sent to ED by Dr. 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    foot neoplasm  etoh use disorder  AMINTA  Beer Drinker  GERD  DM  HTN  HLD  OP  OA    nikkie op optimization  plan for OR with Podiatric Surgery  thiamine  folic acid  ciwa  counseling  education  AA referral  SBIRT documentation   Ativan PRN as per CIWA - PO and IV  
The patient is a 68 year old male with a history of HTN, HL, DM who was sent in for foot surgery.     Plan:  - ECG with no evidence of ischemia or infarction  - Cardiac cath 4/22 with normal coronaries and normal EF  - Continue losartan 100 mg daily  - Continue metoprolol succinate 50 mg daily  - Hold HCTZ for now - resume on discharge  - Continue atorvastatin 80 mg daily  - Podiatry follow-up
The patient is a 68 year old male with a history of HTN, HL, DM who was sent in for foot surgery.     Plan:  - ECG with no evidence of ischemia or infarction  - Cardiac cath 4/22 with normal coronaries and normal EF  - Continue losartan 100 mg daily  - Continue metoprolol succinate 50 mg daily  - Hold HCTZ for now - resume on discharge  - Continue atorvastatin 80 mg daily  - The patient is at low/intermediate risk for cardiac events for an intermediate risk surgery. The patient is optimized to proceed from a cardiac standpoint for surgery.
67 yo M PMHx HTN, HLD, DM, GERD sent to ED by Dr. 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    foot neoplasm  etoh use disorder  AMINTA  Beer Drinker  GERD  DM  HTN  HLD  OP  OA    ENDO eval noted  POD 1  vs noted    thiamine  folic acid  ciwa  counseling  education  AA referral  SBIRT documentation   Ativan PRN as per CIWA - PO and IV
67 yo M PMHx HTN, HLD, DM, GERD sent to ED by Dr. 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    foot neoplasm  etoh use disorder  AMINTA  Beer Drinker  GERD  DM  HTN  HLD  OP  OA    POD 1   vs noted  wean fio2    thiamine  folic acid  ciwa  counseling  education  AA referral  SBIRT documentation   Ativan PRN as per CIWA - PO and IV
69 yo M PMHx HTN, HLD, DM, GERD sent to ED by Dr. 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 Pt c/o chronic heel pain x weeks, otherwise feeling ok . Pt denies fever/chills, numbness/tingling. Surgery planned for tomorrow and card clearance requested Addiction medicine cons called due to ETOH addiction hx ( patient drinks 5 beers a day , quit smoking 12 yrs ago ,patient  used to drink and smoke much more as per wife )
The patient is a 68 year old male with a history of HTN, HL, DM who was sent in for foot surgery.     Plan:  - ECG with no evidence of ischemia or infarction  - Cardiac cath 4/22 with normal coronaries and normal EF  - Continue losartan 100 mg daily  - Continue metoprolol succinate 50 mg daily  - Hold HCTZ for now - resume on discharge  - Continue atorvastatin 80 mg daily  - Podiatry follow-up
67 yo M PMHx HTN, HLD, DM, GERD sent to ED by Dr. 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 Pt c/o chronic heel pain x weeks, otherwise feeling ok . Pt denies fever/chills, numbness/tingling. Surgery planned for tomorrow and card clearance requested Addiction medicine cons called due to ETOH addiction hx ( patient drinks 5 beers a day , quit smoking 12 yrs ago ,patient  used to drink and smoke much more as per wife )
69 yo M PMHx HTN, HLD, DM, GERD sent to ED by Dr. 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 Pt c/o chronic heel pain x weeks, otherwise feeling ok . Pt denies fever/chills, numbness/tingling. Surgery planned for tomorrow and card clearance requested Addiction medicine cons called due to ETOH addiction hx ( patient drinks 5 beers a day , quit smoking 12 yrs ago ,patient  used to drink and smoke much more as per wife )
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

## 2023-11-02 NOTE — PROGRESS NOTE ADULT - REASON FOR ADMISSION
right heel pain

## 2023-11-02 NOTE — PROGRESS NOTE ADULT - SUBJECTIVE AND OBJECTIVE BOX
PROGRESS NOTE  Patient is a 68y old  Male who presents with a chief complaint of right heel pain (02 Nov 2023 05:04)  Chart and available morning labs /imaging are reviewed electronically , urgent issues addressed . More information  is being added upon completion of rounds , when more information is collected and management discussed with consultants , medical staff and social service/case management on the floor   OVERNIGHT  No new issues reported by medical staff . All above noted Patient is resting in a bed comfortably .Feels much better , ready to go home  .No distress noted   Wife is at bedside and plan of care discussed ,she didnt  rx yet , will stop by in a pharmacy after patient is home   HPI:  69 yo M PMHx HTN, HLD, DM, GERD sent to ED by Dr. 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 Pt c/o chronic heel pain x weeks, otherwise feeling ok . Pt denies fever/chills, numbness/tingling. Surgery planned for tomorrow and card clearance requested Addiction medicine cons called due to ETOH addiction hx ( patient drinks 5 beers a day , quit smoking 12 yrs ago ,patient  used to drink and smoke much more as per wife ) (30 Oct 2023 12:02)    PAST MEDICAL & SURGICAL HISTORY:  HTN (hypertension)      HLD (hyperlipidemia)      DM (diabetes mellitus)      Neuropathy      GERD (gastroesophageal reflux disease)          MEDICATIONS  (STANDING):  aspirin enteric coated 81 milliGRAM(s) Oral daily  atorvastatin 80 milliGRAM(s) Oral at bedtime  BUpivacaine liposome 1.3% Injectable 20 milliLiter(s) Local Injection once  ceFAZolin   IVPB 2000 milliGRAM(s) IV Intermittent every 8 hours  dextrose 5%. 1000 milliLiter(s) (100 mL/Hr) IV Continuous <Continuous>  dextrose 5%. 1000 milliLiter(s) (50 mL/Hr) IV Continuous <Continuous>  dextrose 50% Injectable 25 Gram(s) IV Push once  dextrose 50% Injectable 12.5 Gram(s) IV Push once  dextrose 50% Injectable 25 Gram(s) IV Push once  enoxaparin Injectable 40 milliGRAM(s) SubCutaneous every 24 hours  folic acid 1 milliGRAM(s) Oral daily  gabapentin 300 milliGRAM(s) Oral two times a day  glucagon  Injectable 1 milliGRAM(s) IntraMuscular once  insulin glargine Injectable (LANTUS) 10 Unit(s) SubCutaneous at bedtime  insulin lispro (ADMELOG) corrective regimen sliding scale   SubCutaneous at bedtime  insulin lispro (ADMELOG) corrective regimen sliding scale   SubCutaneous three times a day before meals  losartan 100 milliGRAM(s) Oral daily  metoprolol succinate ER 50 milliGRAM(s) Oral daily  multivitamin 1 Tablet(s) Oral daily  pantoprazole    Tablet 40 milliGRAM(s) Oral before breakfast  senna 2 Tablet(s) Oral at bedtime  thiamine 100 milliGRAM(s) Oral daily    MEDICATIONS  (PRN):  aluminum hydroxide/magnesium hydroxide/simethicone Suspension 30 milliLiter(s) Oral every 4 hours PRN Dyspepsia  dextrose Oral Gel 15 Gram(s) Oral once PRN Blood Glucose LESS THAN 70 milliGRAM(s)/deciliter  LORazepam     Tablet 1 milliGRAM(s) Oral every 2 hours PRN for ciwa > 5  LORazepam   Injectable 2 milliGRAM(s) IV Push every 30 minutes PRN for CIWA > 8  magnesium hydroxide Suspension 30 milliLiter(s) Oral daily PRN Constipation  melatonin 3 milliGRAM(s) Oral at bedtime PRN Insomnia  morphine  - Injectable 2 milliGRAM(s) IV Push every 6 hours PRN Severe Pain (7 - 10)  ondansetron Injectable 4 milliGRAM(s) IV Push every 8 hours PRN Nausea and/or Vomiting  oxycodone    5 mG/acetaminophen 325 mG 1 Tablet(s) Oral every 4 hours PRN Moderate Pain (4 - 6)      OBJECTIVE    T(C): 37.2 (11-02-23 @ 12:08), Max: 37.2 (11-02-23 @ 07:01)  HR: 68 (11-02-23 @ 12:08) (61 - 68)  BP: 143/65 (11-02-23 @ 12:08) (136/45 - 144/64)  RR: 18 (11-02-23 @ 12:08) (18 - 18)  SpO2: 92% (11-02-23 @ 12:08) (90% - 95%)  Wt(kg): --  I&O's Summary    01 Nov 2023 07:01  -  02 Nov 2023 07:00  --------------------------------------------------------  IN: 0 mL / OUT: 400 mL / NET: -400 mL          REVIEW OF SYSTEMS:  CONSTITUTIONAL: No fever, weight loss, or fatigue  EYES: No eye pain, visual disturbances, or discharge  ENMT:   No sinus or throat pain  NECK: No pain or stiffness  RESPIRATORY: No cough, wheezing, chills or hemoptysis; No shortness of breath  CARDIOVASCULAR: No chest pain, palpitations, dizziness, or leg swelling  GASTROINTESTINAL: No abdominal pain. No nausea, vomiting; No diarrhea or constipation. No melena or hematochezia.  GENITOURINARY: No dysuria, frequency, hematuria, or incontinence  NEUROLOGICAL: No headaches, memory loss, loss of strength, numbness, or tremors  SKIN: No itching, burning, rashes, or lesions   MUSCULOSKELETAL: No joint pain or swelling; No muscle, back, or extremity pain    PHYSICAL EXAM:  Appearance: NAD. VS past 24 hrs -as above   HEENT:   Moist oral mucosa. Conjunctiva clear b/l.   Neck : supple  Respiratory: Lungs CTAB.  Gastrointestinal:  Soft, nontender. No rebound. No rigidity. BS present	  Cardiovascular: RRR ,S1S2 present  Neurologic: Non-focal. Moving all extremities.  Extremities: No edema. No erythema. No calf tenderness. r foot dressing intact   Skin: No rashes, No ecchymoses, No cyanosis.	  wounds ,skin lesions-See skin assesment flow sheet   LABS:                        11.0   7.61  )-----------( 197      ( 02 Nov 2023 06:38 )             33.8     11-02    143  |  106  |  14  ----------------------------<  142<H>  3.5   |  28  |  1.00    Ca    9.2      02 Nov 2023 06:38      CAPILLARY BLOOD GLUCOSE      POCT Blood Glucose.: 176 mg/dL (02 Nov 2023 11:42)  POCT Blood Glucose.: 276 mg/dL (02 Nov 2023 07:48)  POCT Blood Glucose.: 233 mg/dL (01 Nov 2023 21:39)  POCT Blood Glucose.: 199 mg/dL (01 Nov 2023 16:39)      Urinalysis Basic - ( 02 Nov 2023 06:38 )    Color: x / Appearance: x / SG: x / pH: x  Gluc: 142 mg/dL / Ketone: x  / Bili: x / Urobili: x   Blood: x / Protein: x / Nitrite: x   Leuk Esterase: x / RBC: x / WBC x   Sq Epi: x / Non Sq Epi: x / Bacteria: x        RADIOLOGY & ADDITIONAL TESTS:   reviewed elctronically  ASSESSMENT/PLAN: 	    Patient was seen and examined on a day of discharge . Plan of care , discharge medications and recommendations discussed with consultants and clearance for discharge obtained .Social service , case management  and medical staff are aware of plan. Family is notified. Discharge summary  is  prepared electronically-see separate document prepared by me .75minutes spent on this visit, 50% visit time spent in care co-ordination with other attendings and counselling patient  I have discussed care plan with patient and HCP, expressed understanding of problems treatment and their effect and side effects, alternatives in detail,I have asked if they have any questions and concerns and appropriately addressed them to best of my ability

## 2023-11-02 NOTE — CASE MANAGEMENT PROGRESS NOTE - NSCMPROGRESSNOTE_GEN_ALL_CORE
Met with patient at bedside and spoke with daughter Evie on phone.  Anticipate transition home today.  RW was obtained for patient via [facility].  Patient is declining HCPT.  Patient daughter states she will perform wound care for patient at home.  Family will transport patient home when D/C ready.  Will remain available.

## 2023-11-02 NOTE — CHART NOTE - NSCHARTNOTEFT_GEN_A_CORE
Wound Care Instructions:    Please keep right lower leg dressing and splint intact, dry, and clean until your follow up within 1 week upon discharge from the hospital

## 2023-11-02 NOTE — PROGRESS NOTE ADULT - PROBLEM SELECTOR PLAN 1
Benign right calcaneal intraosseous lipoma, Milgram stage II. ·  PRE-OP DIAGNOSIS:  Bone cyst 31-Oct-2023   ·  POST-OP DIAGNOSIS:  Bone cyst 31-Oct-2023   ·  PROCEDURES:  Surgical removal of bone cyst from calcaneus
Patient examined and evaluated at this time.  Discussed the need to remain strictly nonweightbearing to the right lower extremity.  Patient advised regarding the postoperative healing process and all questions answered to satisfaction.  Patient verbalized understanding at this time.    Wound care instructions:  Patient to keep right lower extremity dressing clean, dry, and intact until follow-up within 1 week.
Benign right calcaneal intraosseous lipoma, Milgram stage II. ·  PRE-OP DIAGNOSIS:  Bone cyst 31-Oct-2023   ·  POST-OP DIAGNOSIS:  Bone cyst 31-Oct-2023   ·  PROCEDURES:  Surgical removal of bone cyst from calcaneus 11/01 -case d/w Dr cruz , discharge when ready  on keflex for 7 days and prn percocet .
Benign right calcaneal intraosseous lipoma, Milgram stage II. ·  PRE-OP DIAGNOSIS:  Bone cyst 31-Oct-2023   ·  POST-OP DIAGNOSIS:  Bone cyst 31-Oct-2023   ·  PROCEDURES:  Surgical removal of bone cyst from calcaneus 11/01 -case d/w Dr cruz , discharge when ready  on keflex for 7 days and prn percocet . Follow pathology as outpatient , appointment will be scheduled on coming Monday

## 2023-11-02 NOTE — DISCHARGE NOTE NURSING/CASE MANAGEMENT/SOCIAL WORK - NSDCFUADDAPPT_GEN_ALL_CORE_FT
Please call and make an appointment with your primary care provider / medical doctor and private endocrinologist within 1-2 weeks after discharge .Please do not use alcohol while taking pain medication ( percocet ) .No driving until cleared by  .Please schedule appointment with podiatry team within 5 days after discharge from the hospital .Please follow the rest of instructions provided by surgical /podiatry team.

## 2023-11-02 NOTE — PROGRESS NOTE ADULT - PROVIDER SPECIALTY LIST ADULT
Addiction Medicine
Podiatry
Cardiology
Addiction Medicine
Addiction Medicine
Hospitalist

## 2023-11-02 NOTE — DISCHARGE NOTE NURSING/CASE MANAGEMENT/SOCIAL WORK - PATIENT PORTAL LINK FT
You can access the FollowMyHealth Patient Portal offered by [facility] by registering at the following website: [email or web address]. By joining [facility]’s FollowMyHealth portal, you will also be able to view your health information using other applications (apps) compatible with our system.

## 2023-11-02 NOTE — DISCHARGE NOTE NURSING/CASE MANAGEMENT/SOCIAL WORK - NSDCPEFALRISK_GEN_ALL_CORE
For information on Fall & Injury Prevention, visit: [email or web address] OR  [email or web address] OR  https://www.cdc.gov/steadi/patient.html

## 2023-11-06 ENCOUNTER — APPOINTMENT (OUTPATIENT)
Dept: WOUND CARE | Facility: HOSPITAL | Age: 68
End: 2023-11-06
Payer: MEDICARE

## 2023-11-06 ENCOUNTER — OUTPATIENT (OUTPATIENT)
Dept: OUTPATIENT SERVICES | Facility: HOSPITAL | Age: 68
LOS: 1 days | Discharge: HOME | End: 2023-11-06
Payer: MEDICARE

## 2023-11-06 VITALS
TEMPERATURE: 98 F | RESPIRATION RATE: 18 BRPM | HEIGHT: 70 IN | HEART RATE: 65 BPM | WEIGHT: 190 LBS | SYSTOLIC BLOOD PRESSURE: 130 MMHG | BODY MASS INDEX: 27.2 KG/M2 | DIASTOLIC BLOOD PRESSURE: 71 MMHG | OXYGEN SATURATION: 97 %

## 2023-11-06 DIAGNOSIS — Z79.84 LONG TERM (CURRENT) USE OF ORAL HYPOGLYCEMIC DRUGS: ICD-10-CM

## 2023-11-06 DIAGNOSIS — Z87.891 PERSONAL HISTORY OF NICOTINE DEPENDENCE: ICD-10-CM

## 2023-11-06 DIAGNOSIS — Z91.048 OTHER NONMEDICINAL SUBSTANCE ALLERGY STATUS: ICD-10-CM

## 2023-11-06 DIAGNOSIS — M85.671 OTHER CYST OF BONE, RIGHT ANKLE AND FOOT: ICD-10-CM

## 2023-11-06 DIAGNOSIS — Z88.1 ALLERGY STATUS TO OTHER ANTIBIOTIC AGENTS STATUS: ICD-10-CM

## 2023-11-06 DIAGNOSIS — M19.90 UNSPECIFIED OSTEOARTHRITIS, UNSPECIFIED SITE: ICD-10-CM

## 2023-11-06 DIAGNOSIS — E11.40 TYPE 2 DIABETES MELLITUS WITH DIABETIC NEUROPATHY, UNSPECIFIED: ICD-10-CM

## 2023-11-06 DIAGNOSIS — Z86.16 PERSONAL HISTORY OF COVID-19: ICD-10-CM

## 2023-11-06 DIAGNOSIS — E78.00 PURE HYPERCHOLESTEROLEMIA, UNSPECIFIED: ICD-10-CM

## 2023-11-06 DIAGNOSIS — Z83.3 FAMILY HISTORY OF DIABETES MELLITUS: ICD-10-CM

## 2023-11-06 DIAGNOSIS — I10 ESSENTIAL (PRIMARY) HYPERTENSION: ICD-10-CM

## 2023-11-06 PROCEDURE — 99024 POSTOP FOLLOW-UP VISIT: CPT

## 2023-11-06 PROCEDURE — G0463: CPT

## 2023-11-13 PROCEDURE — 99285 EMERGENCY DEPT VISIT HI MDM: CPT | Mod: 25

## 2023-11-13 PROCEDURE — 71045 X-RAY EXAM CHEST 1 VIEW: CPT

## 2023-11-13 PROCEDURE — 86140 C-REACTIVE PROTEIN: CPT

## 2023-11-13 PROCEDURE — 80053 COMPREHEN METABOLIC PANEL: CPT

## 2023-11-13 PROCEDURE — C1889: CPT

## 2023-11-13 PROCEDURE — 80048 BASIC METABOLIC PNL TOTAL CA: CPT

## 2023-11-13 PROCEDURE — 83036 HEMOGLOBIN GLYCOSYLATED A1C: CPT

## 2023-11-13 PROCEDURE — 88304 TISSUE EXAM BY PATHOLOGIST: CPT

## 2023-11-13 PROCEDURE — 97116 GAIT TRAINING THERAPY: CPT

## 2023-11-13 PROCEDURE — 85027 COMPLETE CBC AUTOMATED: CPT

## 2023-11-13 PROCEDURE — 73630 X-RAY EXAM OF FOOT: CPT

## 2023-11-13 PROCEDURE — 76000 FLUOROSCOPY <1 HR PHYS/QHP: CPT

## 2023-11-13 PROCEDURE — 85025 COMPLETE CBC W/AUTO DIFF WBC: CPT

## 2023-11-13 PROCEDURE — 73701 CT LOWER EXTREMITY W/DYE: CPT | Mod: MA

## 2023-11-13 PROCEDURE — 36415 COLL VENOUS BLD VENIPUNCTURE: CPT

## 2023-11-13 PROCEDURE — 86803 HEPATITIS C AB TEST: CPT

## 2023-11-13 PROCEDURE — 85652 RBC SED RATE AUTOMATED: CPT

## 2023-11-13 PROCEDURE — 93005 ELECTROCARDIOGRAM TRACING: CPT

## 2023-11-13 PROCEDURE — 97530 THERAPEUTIC ACTIVITIES: CPT

## 2023-11-13 PROCEDURE — 82962 GLUCOSE BLOOD TEST: CPT

## 2023-11-13 PROCEDURE — 97162 PT EVAL MOD COMPLEX 30 MIN: CPT

## 2023-11-13 PROCEDURE — 96374 THER/PROPH/DIAG INJ IV PUSH: CPT

## 2023-11-13 PROCEDURE — 85610 PROTHROMBIN TIME: CPT

## 2023-11-15 ENCOUNTER — APPOINTMENT (OUTPATIENT)
Dept: WOUND CARE | Facility: HOSPITAL | Age: 68
End: 2023-11-15
Payer: MEDICARE

## 2023-11-15 ENCOUNTER — OUTPATIENT (OUTPATIENT)
Dept: OUTPATIENT SERVICES | Facility: HOSPITAL | Age: 68
LOS: 1 days | Discharge: HOME | End: 2023-11-15
Payer: MEDICARE

## 2023-11-15 VITALS
OXYGEN SATURATION: 95 % | SYSTOLIC BLOOD PRESSURE: 157 MMHG | HEART RATE: 64 BPM | TEMPERATURE: 98.7 F | RESPIRATION RATE: 18 BRPM | DIASTOLIC BLOOD PRESSURE: 77 MMHG | WEIGHT: 190 LBS | HEIGHT: 70 IN | BODY MASS INDEX: 27.2 KG/M2

## 2023-11-15 DIAGNOSIS — M86.671 OTHER CHRONIC OSTEOMYELITIS, RIGHT ANKLE AND FOOT: ICD-10-CM

## 2023-11-15 DIAGNOSIS — M85.671 OTHER CYST OF BONE, RIGHT ANKLE AND FOOT: ICD-10-CM

## 2023-11-15 PROCEDURE — G0463: CPT

## 2023-11-15 PROCEDURE — 99024 POSTOP FOLLOW-UP VISIT: CPT

## 2023-11-16 DIAGNOSIS — M19.90 UNSPECIFIED OSTEOARTHRITIS, UNSPECIFIED SITE: ICD-10-CM

## 2023-11-16 DIAGNOSIS — M85.671 OTHER CYST OF BONE, RIGHT ANKLE AND FOOT: ICD-10-CM

## 2023-11-16 DIAGNOSIS — E11.40 TYPE 2 DIABETES MELLITUS WITH DIABETIC NEUROPATHY, UNSPECIFIED: ICD-10-CM

## 2023-11-16 DIAGNOSIS — Z88.1 ALLERGY STATUS TO OTHER ANTIBIOTIC AGENTS STATUS: ICD-10-CM

## 2023-11-16 DIAGNOSIS — Z79.84 LONG TERM (CURRENT) USE OF ORAL HYPOGLYCEMIC DRUGS: ICD-10-CM

## 2023-11-16 DIAGNOSIS — Z86.16 PERSONAL HISTORY OF COVID-19: ICD-10-CM

## 2023-11-16 DIAGNOSIS — I10 ESSENTIAL (PRIMARY) HYPERTENSION: ICD-10-CM

## 2023-11-16 DIAGNOSIS — Z91.048 OTHER NONMEDICINAL SUBSTANCE ALLERGY STATUS: ICD-10-CM

## 2023-11-16 DIAGNOSIS — E78.00 PURE HYPERCHOLESTEROLEMIA, UNSPECIFIED: ICD-10-CM

## 2023-11-16 DIAGNOSIS — Z83.3 FAMILY HISTORY OF DIABETES MELLITUS: ICD-10-CM

## 2023-11-16 DIAGNOSIS — Z87.891 PERSONAL HISTORY OF NICOTINE DEPENDENCE: ICD-10-CM

## 2023-11-24 ENCOUNTER — APPOINTMENT (OUTPATIENT)
Dept: WOUND CARE | Facility: HOSPITAL | Age: 68
End: 2023-11-24
Payer: MEDICARE

## 2023-11-24 ENCOUNTER — OUTPATIENT (OUTPATIENT)
Dept: OUTPATIENT SERVICES | Facility: HOSPITAL | Age: 68
LOS: 1 days | Discharge: HOME | End: 2023-11-24
Payer: MEDICARE

## 2023-11-24 VITALS
RESPIRATION RATE: 18 BRPM | SYSTOLIC BLOOD PRESSURE: 176 MMHG | HEART RATE: 67 BPM | TEMPERATURE: 98 F | BODY MASS INDEX: 27.2 KG/M2 | DIASTOLIC BLOOD PRESSURE: 80 MMHG | WEIGHT: 190 LBS | HEIGHT: 70 IN | OXYGEN SATURATION: 94 %

## 2023-11-24 DIAGNOSIS — M85.671 OTHER CYST OF BONE, RIGHT ANKLE AND FOOT: ICD-10-CM

## 2023-11-24 PROCEDURE — 99024 POSTOP FOLLOW-UP VISIT: CPT

## 2023-11-24 PROCEDURE — 73630 X-RAY EXAM OF FOOT: CPT | Mod: 26,RT

## 2023-11-24 PROCEDURE — G0463: CPT

## 2023-11-24 PROCEDURE — 73630 X-RAY EXAM OF FOOT: CPT
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
## 2023-11-26 DIAGNOSIS — Z86.16 PERSONAL HISTORY OF COVID-19: ICD-10-CM

## 2023-11-26 DIAGNOSIS — Z88.1 ALLERGY STATUS TO OTHER ANTIBIOTIC AGENTS STATUS: ICD-10-CM

## 2023-11-26 DIAGNOSIS — E11.40 TYPE 2 DIABETES MELLITUS WITH DIABETIC NEUROPATHY, UNSPECIFIED: ICD-10-CM

## 2023-11-26 DIAGNOSIS — T81.89XD OTHER COMPLICATIONS OF PROCEDURES, NOT ELSEWHERE CLASSIFIED, SUBSEQUENT ENCOUNTER: ICD-10-CM

## 2023-11-26 DIAGNOSIS — Z91.048 OTHER NONMEDICINAL SUBSTANCE ALLERGY STATUS: ICD-10-CM

## 2023-11-26 DIAGNOSIS — E78.00 PURE HYPERCHOLESTEROLEMIA, UNSPECIFIED: ICD-10-CM

## 2023-11-26 DIAGNOSIS — M19.90 UNSPECIFIED OSTEOARTHRITIS, UNSPECIFIED SITE: ICD-10-CM

## 2023-11-26 DIAGNOSIS — Z79.84 LONG TERM (CURRENT) USE OF ORAL HYPOGLYCEMIC DRUGS: ICD-10-CM

## 2023-11-26 DIAGNOSIS — I10 ESSENTIAL (PRIMARY) HYPERTENSION: ICD-10-CM
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
## 2023-11-26 DIAGNOSIS — Z83.3 FAMILY HISTORY OF DIABETES MELLITUS: ICD-10-CM

## 2023-11-26 DIAGNOSIS — Z87.891 PERSONAL HISTORY OF NICOTINE DEPENDENCE: ICD-10-CM

## 2023-12-06 ENCOUNTER — APPOINTMENT (OUTPATIENT)
Dept: WOUND CARE | Facility: HOSPITAL | Age: 68
End: 2023-12-06
Payer: MEDICARE

## 2023-12-06 ENCOUNTER — OUTPATIENT (OUTPATIENT)
Dept: OUTPATIENT SERVICES | Facility: HOSPITAL | Age: 68
LOS: 1 days | Discharge: HOME | End: 2023-12-06
Payer: MEDICARE

## 2023-12-06 VITALS
OXYGEN SATURATION: 93 % | HEIGHT: 70 IN | BODY MASS INDEX: 27.2 KG/M2 | TEMPERATURE: 98.2 F | WEIGHT: 190 LBS | DIASTOLIC BLOOD PRESSURE: 79 MMHG | SYSTOLIC BLOOD PRESSURE: 196 MMHG | HEART RATE: 71 BPM | RESPIRATION RATE: 20 BRPM

## 2023-12-06 VITALS — DIASTOLIC BLOOD PRESSURE: 78 MMHG | SYSTOLIC BLOOD PRESSURE: 171 MMHG

## 2023-12-06 DIAGNOSIS — M85.671 OTHER CYST OF BONE, RIGHT ANKLE AND FOOT: ICD-10-CM

## 2023-12-06 PROCEDURE — 99024 POSTOP FOLLOW-UP VISIT: CPT

## 2023-12-06 PROCEDURE — G0463: CPT

## 2023-12-07 ENCOUNTER — APPOINTMENT (OUTPATIENT)
Dept: ORTHOPEDIC SURGERY | Facility: CLINIC | Age: 68
End: 2023-12-07

## 2023-12-13 DIAGNOSIS — Z79.84 LONG TERM (CURRENT) USE OF ORAL HYPOGLYCEMIC DRUGS: ICD-10-CM

## 2023-12-13 DIAGNOSIS — Z87.891 PERSONAL HISTORY OF NICOTINE DEPENDENCE: ICD-10-CM

## 2023-12-13 DIAGNOSIS — T81.89XD OTHER COMPLICATIONS OF PROCEDURES, NOT ELSEWHERE CLASSIFIED, SUBSEQUENT ENCOUNTER: ICD-10-CM
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
## 2023-12-13 DIAGNOSIS — E78.00 PURE HYPERCHOLESTEROLEMIA, UNSPECIFIED: ICD-10-CM

## 2023-12-13 DIAGNOSIS — I10 ESSENTIAL (PRIMARY) HYPERTENSION: ICD-10-CM

## 2023-12-13 DIAGNOSIS — Z91.048 OTHER NONMEDICINAL SUBSTANCE ALLERGY STATUS: ICD-10-CM

## 2023-12-13 DIAGNOSIS — E11.40 TYPE 2 DIABETES MELLITUS WITH DIABETIC NEUROPATHY, UNSPECIFIED: ICD-10-CM

## 2023-12-13 DIAGNOSIS — Z88.1 ALLERGY STATUS TO OTHER ANTIBIOTIC AGENTS STATUS: ICD-10-CM

## 2023-12-13 DIAGNOSIS — M19.90 UNSPECIFIED OSTEOARTHRITIS, UNSPECIFIED SITE: ICD-10-CM

## 2023-12-13 DIAGNOSIS — Z86.16 PERSONAL HISTORY OF COVID-19: ICD-10-CM

## 2023-12-13 DIAGNOSIS — Z83.3 FAMILY HISTORY OF DIABETES MELLITUS: ICD-10-CM
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
## 2023-12-13 NOTE — ASSESSMENT
[] : No [FreeTextEntry2] : Infection Prevention Wound care and Dressing changes Promote and Restore optimal skin integrity Nutrition and Wound Healing  Offloading and Pressure relief Protect and Guard wound site  Compliance R/T Elevation of Lower Extremities [FreeTextEntry4] : Patient continues to use crutches occasionally, but primarily uses a Knee scooter to get around his home.  Jose (Orthotist) fit patient with CAM Boot at this visit.  Discussed proper way to apply.  DPM Mirza assessed wound site - No medication or dressing needed.  Cotton sock only.  As per DPM, Patient to remain non-weight bearing, but to begin using the CAM Boot.  Patient advised to sleep in CAM Boot to stabilize Right Foot (Patient stated that he "moves around a lot while in bed") DPM applied CAM Boot and demonstrated how to place on foot/leg properly. DPM requesting additional X-Ray before next appointment - requisition given to patient. Patient may now shower. DPM discussed possibility of weight-bearing after next week's visit.   Patient and spouse verbalized understanding of all discussed. Patient to return to [facility] in One Week.

## 2023-12-13 NOTE — PLAN
[FreeTextEntry1] : Patient examined and evaluated at this time. Patient advised regarding need to remain nonweight bearing at this time. Patient to obtain new radiographs. Spent 20 minutes for patient care and medical decision making. Patient to follow up in 1 week.

## 2023-12-13 NOTE — PHYSICAL EXAM
[2+] : left 2+ [Alert] : alert [Oriented to Person] : oriented to person [Oriented to Place] : oriented to place [Calm] : calm [Ankle Swelling (On Exam)] : not present [Varicose Veins Of Lower Extremities] : not present [] : not present [Purpura] : no purpura  [Petechiae] : no petechiae [Skin Ulcer] : no ulcer [de-identified] : A&Ox3, NAD [de-identified] : HTN, HLD [de-identified] : Right heel incision intact, no dehiscence, no purulence, no fluctuance, no proximal streaking, no purulence [de-identified] : s/p excision of the right calcaneal bone cyst with application of bone chips [de-identified] : Light touch sensation intact bilaterally [FreeTextEntry1] : Right Heel  (S/P excision of neoplasm  on 10/31/23) [FreeTextEntry2] : 3.6 [FreeTextEntry3] : 0.1 [FreeTextEntry4] : 0.1 [de-identified] : Small, dry scab noted [de-identified] :   NONE [de-identified] : Mechanically cleansed with sterile gauze and normal saline. Cotton Sock CAM Boot     [TWNoteComboBox4] : None [TWNoteComboBox5] : No [TWNoteComboBox6] : Surgical [de-identified] : No [de-identified] : Normal [de-identified] : None [de-identified] : None [de-identified] : 100% [de-identified] : No

## 2023-12-13 NOTE — HISTORY OF PRESENT ILLNESS
[FreeTextEntry1] : Patient presents with a chief complaint of pain to his right heel.  Patient presents today with his wife and daughter.  Patient relates that he was initially seen by his outside podiatrist who obtained radiographs a few months prior and noted a bone lesion of his right heel bone. Patient was advised regarding surgical intervention at this time and relates that he went to see an orthopedic surgeon (Dr. Trivedi) who after obtaining an MRI, advised regarding monitoring for any changes. Pt relates that in the past week, he has noticed pain to his right heel and states that the pain is starting to radiate to the front of his foot. Pt relates that he is putting more pressure on his left foot to compensate which is causing pain. Of note is that patient is a type 2 diabetic with a recent hemoglobin a1c level which patient's family reports to be 9.7 earlier this month. Patient relates that he has recently changed his diabetic medication regimen which has lowered his recent blood sugar levels.  12/6/2023: Patient seen status post right calcaneal bone cyst excision with application of cancellous bone chips (DOS: 10/31/2023).  Patient relates that he has been doing well and relates that he has been keeping the weight off of his right foot as much as possible.  Patient relates that his pain is controlled at this time.  Denies any other complaints at this time.

## 2023-12-13 NOTE — REVIEW OF SYSTEMS
[Negative] : Heme/Lymph [Fever] : no fever [Chills] : no chills [Eye Pain] : no eye pain [Loss Of Hearing] : no hearing loss [Shortness Of Breath] : no shortness of breath [Wheezing] : no wheezing [Abdominal Pain] : no abdominal pain [Skin Lesions] : no skin lesions [Skin Wound] : no skin wound [Anxiety] : no anxiety [Easy Bleeding] : no tendency for easy bleeding [FreeTextEntry5] : HTN, HLD [FreeTextEntry9] : right foot pain [de-identified] : IDDM with neuropathy [de-identified] : CAMILO

## 2023-12-14 ENCOUNTER — NON-APPOINTMENT (OUTPATIENT)
Age: 68
End: 2023-12-14

## 2023-12-15 ENCOUNTER — APPOINTMENT (OUTPATIENT)
Dept: WOUND CARE | Facility: HOSPITAL | Age: 68
End: 2023-12-15
Payer: MEDICARE

## 2023-12-15 ENCOUNTER — OUTPATIENT (OUTPATIENT)
Dept: OUTPATIENT SERVICES | Facility: HOSPITAL | Age: 68
LOS: 1 days | Discharge: HOME | End: 2023-12-15
Payer: MEDICARE

## 2023-12-15 VITALS
HEIGHT: 70 IN | TEMPERATURE: 98 F | BODY MASS INDEX: 27.2 KG/M2 | DIASTOLIC BLOOD PRESSURE: 82 MMHG | WEIGHT: 190 LBS | RESPIRATION RATE: 20 BRPM | OXYGEN SATURATION: 97 % | HEART RATE: 71 BPM | SYSTOLIC BLOOD PRESSURE: 182 MMHG

## 2023-12-15 DIAGNOSIS — M85.671 OTHER CYST OF BONE, RIGHT ANKLE AND FOOT: ICD-10-CM

## 2023-12-15 PROCEDURE — G0463: CPT

## 2023-12-15 PROCEDURE — 73630 X-RAY EXAM OF FOOT: CPT | Mod: 26,RT

## 2023-12-15 PROCEDURE — 99024 POSTOP FOLLOW-UP VISIT: CPT

## 2023-12-15 PROCEDURE — 73630 X-RAY EXAM OF FOOT: CPT

## 2023-12-20 DIAGNOSIS — T81.89XD OTHER COMPLICATIONS OF PROCEDURES, NOT ELSEWHERE CLASSIFIED, SUBSEQUENT ENCOUNTER: ICD-10-CM

## 2023-12-20 DIAGNOSIS — M19.90 UNSPECIFIED OSTEOARTHRITIS, UNSPECIFIED SITE: ICD-10-CM

## 2023-12-20 DIAGNOSIS — Z87.891 PERSONAL HISTORY OF NICOTINE DEPENDENCE: ICD-10-CM

## 2023-12-20 DIAGNOSIS — Z83.3 FAMILY HISTORY OF DIABETES MELLITUS: ICD-10-CM

## 2023-12-20 DIAGNOSIS — E11.40 TYPE 2 DIABETES MELLITUS WITH DIABETIC NEUROPATHY, UNSPECIFIED: ICD-10-CM

## 2023-12-20 DIAGNOSIS — E78.00 PURE HYPERCHOLESTEROLEMIA, UNSPECIFIED: ICD-10-CM
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
## 2023-12-20 DIAGNOSIS — I10 ESSENTIAL (PRIMARY) HYPERTENSION: ICD-10-CM
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
## 2023-12-20 DIAGNOSIS — Z79.84 LONG TERM (CURRENT) USE OF ORAL HYPOGLYCEMIC DRUGS: ICD-10-CM

## 2023-12-20 DIAGNOSIS — Z86.16 PERSONAL HISTORY OF COVID-19: ICD-10-CM

## 2023-12-20 DIAGNOSIS — Z91.048 OTHER NONMEDICINAL SUBSTANCE ALLERGY STATUS: ICD-10-CM

## 2023-12-20 DIAGNOSIS — Z88.1 ALLERGY STATUS TO OTHER ANTIBIOTIC AGENTS STATUS: ICD-10-CM

## 2023-12-20 NOTE — HISTORY OF PRESENT ILLNESS
[FreeTextEntry1] : Patient presents with a chief complaint of pain to his right heel.  Patient presents today with his wife and daughter.  Patient relates that he was initially seen by his outside podiatrist who obtained radiographs a few months prior and noted a bone lesion of his right heel bone. Patient was advised regarding surgical intervention at this time and relates that he went to see an orthopedic surgeon (Dr. Trivedi) who after obtaining an MRI, advised regarding monitoring for any changes. Pt relates that in the past week, he has noticed pain to his right heel and states that the pain is starting to radiate to the front of his foot. Pt relates that he is putting more pressure on his left foot to compensate which is causing pain. Of note is that patient is a type 2 diabetic with a recent hemoglobin a1c level which patient's family reports to be 9.7 earlier this month. Patient relates that he has recently changed his diabetic medication regimen which has lowered his recent blood sugar levels.  12/15/2023: Patient seen status post right calcaneal bone cyst excision with application of cancellous bone chips (DOS: 10/31/2023).  Patient relates that he has been doing well and relates that he has been keeping the weight off of his right foot as much as possible. Denies any other complaints at this time.

## 2023-12-20 NOTE — REVIEW OF SYSTEMS
[Fever] : no fever [Chills] : no chills [Eye Pain] : no eye pain [Loss Of Hearing] : no hearing loss [Shortness Of Breath] : no shortness of breath [Wheezing] : no wheezing [Abdominal Pain] : no abdominal pain [Skin Lesions] : no skin lesions [Skin Wound] : no skin wound [Anxiety] : no anxiety [Easy Bleeding] : no tendency for easy bleeding [Negative] : Heme/Lymph [FreeTextEntry5] : HTN, HLD [FreeTextEntry9] : right foot pain [de-identified] : IDDM with neuropathy [de-identified] : CAMILO

## 2023-12-20 NOTE — PLAN
[FreeTextEntry1] : Patient examined and evaluated at this time. Radiographs reviewed with the patient and all questions answered to satisfaction. Patient to transition to weightbearing in a cam boot at this time. Spent 20 minutes for patient care and medical decision making. Patient to follow up in 2 weeks.

## 2023-12-20 NOTE — PHYSICAL EXAM
[2+] : left 2+ [Ankle Swelling (On Exam)] : not present [Varicose Veins Of Lower Extremities] : not present [] : not present [Purpura] : no purpura  [Petechiae] : no petechiae [Skin Ulcer] : no ulcer [Alert] : alert [Oriented to Person] : oriented to person [Oriented to Place] : oriented to place [Calm] : calm [de-identified] : A&Ox3, NAD [de-identified] : HTN, HLD [de-identified] : s/p excision of the right calcaneal bone cyst with application of bone chips [de-identified] : Right heel incision intact, no dehiscence, no purulence, no fluctuance, no proximal streaking, no purulence [de-identified] : Light touch sensation intact bilaterally [FreeTextEntry1] : Right lateral ankle/ Heel  [facility] [FreeTextEntry2] : 0.3 [FreeTextEntry3] : 0.5 [FreeTextEntry4] : 0.1 [de-identified] :   NONE [de-identified] : Mechanically cleansed with sterile gauze and normal saline. Cotton Sock CAM Boot     [TWNoteComboBox4] : None [TWNoteComboBox5] : No [TWNoteComboBox6] : Surgical [de-identified] : No [de-identified] : Normal [de-identified] : None [de-identified] : None [de-identified] : None [de-identified] : No

## 2023-12-20 NOTE — ASSESSMENT
[Verbal] : Verbal [Written] : Written [Demo] : Demo [Patient] : Patient [Spouse] : Spouse [Good - alert, interested, motivated] : Good - alert, interested, motivated [Verbalizes knowledge/Understanding] : Verbalizes knowledge/understanding [Foot Care] : foot care [Skin Care] : skin care [Signs and symptoms of infection] : sign and symptoms of infection [Nutrition] : nutrition [How and When to Call] : how and when to call [Patient responsibility to plan of care] : patient responsibility to plan of care [Glycemic Control] : glycemic control [] : Yes [Stable] : stable [Home] : Home [Not Applicable - Long Term Care/Home Health Agency] : Long Term Care/Home Health Agency: Not Applicable [Ambulatory] : Ambulatory [FreeTextEntry2] : Infection Prevention Wound care and Dressing changes Promote and maintain optimal skin integrity Nutrition and Wound Healing  Protect and Guard wound site Increasing ROM/physical therapy [FreeTextEntry4] : F/U  2 weeks Xrays reviewed by DPM and discussed with pt Pt to wear CAM boot for one more week and slowly transition to sneakers in 2 weeks. Pt cleared to begin weight bearing on the right foot as per DPM but should wear CAM boot for one more week Pt allowed to get the area wet in the shower and pat dry, pt/spouse verbalized understanding of the same Rx for physical therapy provided    No

## 2023-12-28 ENCOUNTER — APPOINTMENT (OUTPATIENT)
Dept: WOUND CARE | Facility: HOSPITAL | Age: 68
End: 2023-12-28
Payer: MEDICARE

## 2023-12-28 ENCOUNTER — OUTPATIENT (OUTPATIENT)
Dept: OUTPATIENT SERVICES | Facility: HOSPITAL | Age: 68
LOS: 1 days | Discharge: HOME | End: 2023-12-28
Payer: MEDICARE

## 2023-12-28 VITALS
TEMPERATURE: 97.8 F | BODY MASS INDEX: 27.2 KG/M2 | OXYGEN SATURATION: 96 % | SYSTOLIC BLOOD PRESSURE: 171 MMHG | HEIGHT: 70 IN | HEART RATE: 67 BPM | WEIGHT: 190 LBS | RESPIRATION RATE: 18 BRPM | DIASTOLIC BLOOD PRESSURE: 71 MMHG

## 2023-12-28 DIAGNOSIS — M85.671 OTHER CYST OF BONE, RIGHT ANKLE AND FOOT: ICD-10-CM

## 2023-12-28 PROCEDURE — 99024 POSTOP FOLLOW-UP VISIT: CPT

## 2023-12-28 PROCEDURE — G0463: CPT

## 2023-12-28 NOTE — HISTORY OF PRESENT ILLNESS
[FreeTextEntry1] : Patient presents with a chief complaint of pain to his right heel.  Patient presents today with his wife and daughter.  Patient relates that he was initially seen by his outside podiatrist who obtained radiographs a few months prior and noted a bone lesion of his right heel bone. Patient was advised regarding surgical intervention at this time and relates that he went to see an orthopedic surgeon (Dr. Trivedi) who after obtaining an MRI, advised regarding monitoring for any changes. Pt relates that in the past week, he has noticed pain to his right heel and states that the pain is starting to radiate to the front of his foot. Pt relates that he is putting more pressure on his left foot to compensate which is causing pain. Of note is that patient is a type 2 diabetic with a recent hemoglobin a1c level which patient's family reports to be 9.7 earlier this month. Patient relates that he has recently changed his diabetic medication regimen which has lowered his recent blood sugar levels.  12/28/2023: Patient seen status post right calcaneal bone cyst excision with application of cancellous bone chips (DOS: 10/31/2023).  Patient relates that he has been walking in regular sneakers since the last encounter.  Patient relates that he has pain and stiffness to his right foot, ankle, and lower leg.  Patient relates that he is planning on beginning physical therapy.

## 2023-12-28 NOTE — PHYSICAL EXAM
[2 x 2] : 2 x 2  [2+] : left 2+ [Ankle Swelling (On Exam)] : not present [Varicose Veins Of Lower Extremities] : not present [] : not present [Purpura] : no purpura  [Petechiae] : no petechiae [Skin Ulcer] : no ulcer [Alert] : alert [Oriented to Person] : oriented to person [Oriented to Place] : oriented to place [Calm] : calm [de-identified] : A&Ox3, NAD [de-identified] : HTN, HLD [de-identified] : s/p excision of the right calcaneal bone cyst with application of bone chips [de-identified] : Right heel incision with central superficial opening, periwound erythema, no purulence, no fluctuance, no proximal streaking [de-identified] : Light touch sensation intact bilaterally [FreeTextEntry1] : Right lateral ankle/ Heel  (S/P [facility] of neoplasm  on 10/31/23)- [facility] [FreeTextEntry2] : 0.3 [FreeTextEntry3] : 0.2 [FreeTextEntry4] : 0.1 [de-identified] :  Mupirocin [de-identified] : Mechanically cleansed with sterile gauze and normal saline. Cloth Tape     [TWNoteComboBox4] : None [TWNoteComboBox5] : No [TWNoteComboBox6] : Surgical [de-identified] : No [de-identified] : Erythema [de-identified] : None [de-identified] : None [de-identified] : None [de-identified] : No [de-identified] : Daily [de-identified] : Primary Dressing

## 2023-12-28 NOTE — REVIEW OF SYSTEMS
[Fever] : no fever [Chills] : no chills [Eye Pain] : no eye pain [Loss Of Hearing] : no hearing loss [Shortness Of Breath] : no shortness of breath [Wheezing] : no wheezing [Abdominal Pain] : no abdominal pain [Skin Lesions] : no skin lesions [Skin Wound] : no skin wound [Anxiety] : no anxiety [Easy Bleeding] : no tendency for easy bleeding [Negative] : Heme/Lymph [FreeTextEntry5] : HTN, HLD [FreeTextEntry9] : right foot pain [de-identified] : IDDM with neuropathy [de-identified] : CAMILO

## 2023-12-28 NOTE — ASSESSMENT
[Verbal] : Verbal [Written] : Written [Demo] : Demo [Patient] : Patient [Spouse] : Spouse [Good - alert, interested, motivated] : Good - alert, interested, motivated [Verbalizes knowledge/Understanding] : Verbalizes knowledge/understanding [Dressing changes] : dressing changes [Foot Care] : foot care [Skin Care] : skin care [Signs and symptoms of infection] : sign and symptoms of infection [Nutrition] : nutrition [How and When to Call] : how and when to call [Patient responsibility to plan of care] : patient responsibility to plan of care [Glycemic Control] : glycemic control [] : Yes [Stable] : stable [Home] : Home [Ambulatory] : Ambulatory [Not Applicable - Long Term Care/Home Health Agency] : Long Term Care/Home Health Agency: Not Applicable [FreeTextEntry2] : Infection Prevention Wound care and Dressing changes Promote and maintain optimal skin integrity Nutrition and Wound Healing  Protect and Guard wound site Increasing ROM/physical therapy [FreeTextEntry4] : Keflex called into pharmacy, Pt has Mupirocin at home, Pt able to perform dressing changes, no supplies needed. Pt encourage to do PT, script given at last visit F/U 3-4 weeks

## 2023-12-28 NOTE — PLAN
[FreeTextEntry1] : Patient examined and evaluated at this time. Continue with local wound care. Patient to continue with regular sneakers at this time.  Patient to begin physical therapy. Prescription for oral antibiotic sent to the pharmacy at this time. Spent 20 minutes for patient care and medical decision making. Patient to follow up in 2 to 3 weeks.

## 2023-12-30 DIAGNOSIS — Z79.84 LONG TERM (CURRENT) USE OF ORAL HYPOGLYCEMIC DRUGS: ICD-10-CM
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
## 2023-12-30 DIAGNOSIS — E78.00 PURE HYPERCHOLESTEROLEMIA, UNSPECIFIED: ICD-10-CM
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
## 2023-12-30 DIAGNOSIS — Z91.048 OTHER NONMEDICINAL SUBSTANCE ALLERGY STATUS: ICD-10-CM

## 2023-12-30 DIAGNOSIS — I10 ESSENTIAL (PRIMARY) HYPERTENSION: ICD-10-CM

## 2023-12-30 DIAGNOSIS — T81.89XD OTHER COMPLICATIONS OF PROCEDURES, NOT ELSEWHERE CLASSIFIED, SUBSEQUENT ENCOUNTER: ICD-10-CM

## 2023-12-30 DIAGNOSIS — M19.90 UNSPECIFIED OSTEOARTHRITIS, UNSPECIFIED SITE: ICD-10-CM

## 2023-12-30 DIAGNOSIS — E11.40 TYPE 2 DIABETES MELLITUS WITH DIABETIC NEUROPATHY, UNSPECIFIED: ICD-10-CM

## 2023-12-30 DIAGNOSIS — Z88.1 ALLERGY STATUS TO OTHER ANTIBIOTIC AGENTS STATUS: ICD-10-CM

## 2023-12-30 DIAGNOSIS — Z83.3 FAMILY HISTORY OF DIABETES MELLITUS: ICD-10-CM

## 2023-12-30 DIAGNOSIS — Z86.16 PERSONAL HISTORY OF COVID-19: ICD-10-CM

## 2023-12-30 DIAGNOSIS — Z87.891 PERSONAL HISTORY OF NICOTINE DEPENDENCE: ICD-10-CM

## 2024-01-25 ENCOUNTER — NON-APPOINTMENT (OUTPATIENT)
Age: 69
End: 2024-01-25

## 2024-02-05 ENCOUNTER — OUTPATIENT (OUTPATIENT)
Dept: OUTPATIENT SERVICES | Facility: HOSPITAL | Age: 69
LOS: 1 days | End: 2024-02-05
Payer: MEDICARE

## 2024-02-05 DIAGNOSIS — E11.9 TYPE 2 DIABETES MELLITUS WITHOUT COMPLICATIONS: ICD-10-CM

## 2024-02-05 PROCEDURE — 93923 UPR/LXTR ART STDY 3+ LVLS: CPT

## 2024-02-05 PROCEDURE — 93923 UPR/LXTR ART STDY 3+ LVLS: CPT | Mod: 26

## 2024-02-22 ENCOUNTER — OUTPATIENT (OUTPATIENT)
Dept: OUTPATIENT SERVICES | Facility: HOSPITAL | Age: 69
LOS: 1 days | Discharge: HOME | End: 2024-02-22
Payer: MEDICARE

## 2024-02-22 ENCOUNTER — APPOINTMENT (OUTPATIENT)
Dept: WOUND CARE | Facility: HOSPITAL | Age: 69
End: 2024-02-22
Payer: MEDICARE

## 2024-02-22 VITALS
RESPIRATION RATE: 18 BRPM | TEMPERATURE: 98.5 F | OXYGEN SATURATION: 96 % | DIASTOLIC BLOOD PRESSURE: 83 MMHG | HEART RATE: 70 BPM | HEIGHT: 70 IN | SYSTOLIC BLOOD PRESSURE: 153 MMHG | WEIGHT: 190 LBS | BODY MASS INDEX: 27.2 KG/M2

## 2024-02-22 DIAGNOSIS — T81.89XD OTHER COMPLICATIONS OF PROCEDURES, NOT ELSEWHERE CLASSIFIED, SUBSEQUENT ENCOUNTER: ICD-10-CM

## 2024-02-22 PROCEDURE — G0463: CPT

## 2024-02-22 PROCEDURE — 99213 OFFICE O/P EST LOW 20 MIN: CPT

## 2024-02-22 NOTE — HISTORY OF PRESENT ILLNESS
[FreeTextEntry1] : Patient presents with a chief complaint of pain to his right heel.  Patient presents today with his wife and daughter.  Patient relates that he was initially seen by his outside podiatrist who obtained radiographs a few months prior and noted a bone lesion of his right heel bone. Patient was advised regarding surgical intervention at this time and relates that he went to see an orthopedic surgeon (Dr. Trivedi) who after obtaining an MRI, advised regarding monitoring for any changes. Pt relates that in the past week, he has noticed pain to his right heel and states that the pain is starting to radiate to the front of his foot. Pt relates that he is putting more pressure on his left foot to compensate which is causing pain. Of note is that patient is a type 2 diabetic with a recent hemoglobin a1c level which patient's family reports to be 9.7 earlier this month. Patient relates that he has recently changed his diabetic medication regimen which has lowered his recent blood sugar levels.  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  Patient relates that he has been walking in regular sneakers since the last encounter.  Patient relates that he has started physical therapy, complains of muscle soreness to lower extremity after starting physical therapy.

## 2024-02-22 NOTE — PHYSICAL EXAM
[2 x 2] : 2 x 2  [2+] : left 2+ [Oriented to Person] : oriented to person [Alert] : alert [Calm] : calm [Oriented to Place] : oriented to place [Ankle Swelling (On Exam)] : not present [Varicose Veins Of Lower Extremities] : not present [] : not present [Purpura] : no purpura  [Petechiae] : no petechiae [Skin Ulcer] : no ulcer [de-identified] : A&Ox3, NAD [de-identified] : HTN, HLD [de-identified] : s/p excision of the right calcaneal bone cyst with application of bone chips [de-identified] : Right heel incision healed, periwound erythema, no purulence, no fluctuance, no proximal streaking [de-identified] : Light touch sensation intact bilaterally [FreeTextEntry1] : Right lateral ankle/ Heel  (S/P excision of neoplasm  on 10/31/23)- Healed [de-identified] : No product [de-identified] : Mechanically cleansed with sterile gauze and normal saline. Cloth Tape     [TWNoteComboBox4] : None [TWNoteComboBox5] : No [TWNoteComboBox6] : Surgical [de-identified] : No [de-identified] : Normal [de-identified] : None [de-identified] : None [de-identified] : None [de-identified] : No [de-identified] : Daily [de-identified] : Primary Dressing

## 2024-02-22 NOTE — ASSESSMENT
[Verbal] : Verbal [Written] : Written [Patient] : Patient [Demo] : Demo [Spouse] : Spouse [Good - alert, interested, motivated] : Good - alert, interested, motivated [Verbalizes knowledge/Understanding] : Verbalizes knowledge/understanding [Dressing changes] : dressing changes [Foot Care] : foot care [Skin Care] : skin care [Signs and symptoms of infection] : sign and symptoms of infection [Nutrition] : nutrition [How and When to Call] : how and when to call [Patient responsibility to plan of care] : patient responsibility to plan of care [Glycemic Control] : glycemic control [Home] : Home [Stable] : stable [Ambulatory] : Ambulatory [Not Applicable - Long Term Care/Home Health Agency] : Long Term Care/Home Health Agency: Not Applicable [FreeTextEntry2] : Infection Prevention Wound care and Dressing changes Promote and maintain optimal skin integrity Nutrition and Wound Healing  Protect and Guard wound site Increasing ROM/physical therapy [] : No [FreeTextEntry3] : Incision healed [FreeTextEntry4] : Pt having pain in shins and tips of toes, As per DPM pain is like due to PT and neuropathy, PT to continue PT as ordered. Lyrica called into pharmacy. Pt to return in 6-8 weeks

## 2024-02-22 NOTE — REVIEW OF SYSTEMS
[Negative] : Endocrine [Fever] : no fever [Chills] : no chills [Eye Pain] : no eye pain [Loss Of Hearing] : no hearing loss [Shortness Of Breath] : no shortness of breath [Wheezing] : no wheezing [Abdominal Pain] : no abdominal pain [Skin Lesions] : no skin lesions [Skin Wound] : no skin wound [Anxiety] : no anxiety [Easy Bleeding] : no tendency for easy bleeding [FreeTextEntry5] : HTN, HLD [FreeTextEntry9] : right foot pain [de-identified] : IDDM with neuropathy [de-identified] : CAMILO

## 2024-02-22 NOTE — PLAN
[FreeTextEntry1] : Patient examined and evaluated at this time. Patient to continue with regular sneakers at this time.  Patient to continue physical therapy. Spent 20 minutes for patient care and medical decision making. Patient to follow up in 2 to 3 weeks.

## 2024-02-23 DIAGNOSIS — E11.40 TYPE 2 DIABETES MELLITUS WITH DIABETIC NEUROPATHY, UNSPECIFIED: ICD-10-CM

## 2024-02-23 DIAGNOSIS — T81.89XD OTHER COMPLICATIONS OF PROCEDURES, NOT ELSEWHERE CLASSIFIED, SUBSEQUENT ENCOUNTER: ICD-10-CM

## 2024-02-23 DIAGNOSIS — Z91.048 OTHER NONMEDICINAL SUBSTANCE ALLERGY STATUS: ICD-10-CM

## 2024-02-23 DIAGNOSIS — E78.00 PURE HYPERCHOLESTEROLEMIA, UNSPECIFIED: ICD-10-CM

## 2024-02-23 DIAGNOSIS — M19.90 UNSPECIFIED OSTEOARTHRITIS, UNSPECIFIED SITE: ICD-10-CM

## 2024-02-23 DIAGNOSIS — Z83.3 FAMILY HISTORY OF DIABETES MELLITUS: ICD-10-CM

## 2024-02-23 DIAGNOSIS — I10 ESSENTIAL (PRIMARY) HYPERTENSION: ICD-10-CM

## 2024-02-23 DIAGNOSIS — Z88.1 ALLERGY STATUS TO OTHER ANTIBIOTIC AGENTS STATUS: ICD-10-CM

## 2024-02-23 DIAGNOSIS — Z87.891 PERSONAL HISTORY OF NICOTINE DEPENDENCE: ICD-10-CM
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
## 2024-02-23 DIAGNOSIS — Z86.16 PERSONAL HISTORY OF COVID-19: ICD-10-CM
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
## 2024-02-23 DIAGNOSIS — Z79.84 LONG TERM (CURRENT) USE OF ORAL HYPOGLYCEMIC DRUGS: ICD-10-CM

## 2024-05-02 ENCOUNTER — OUTPATIENT (OUTPATIENT)
Dept: OUTPATIENT SERVICES | Facility: HOSPITAL | Age: 69
LOS: 1 days | Discharge: HOME | End: 2024-05-02
Payer: MEDICARE

## 2024-05-02 ENCOUNTER — APPOINTMENT (OUTPATIENT)
Dept: WOUND CARE | Facility: HOSPITAL | Age: 69
End: 2024-05-02
Payer: MEDICARE

## 2024-05-02 VITALS
RESPIRATION RATE: 18 BRPM | HEIGHT: 70 IN | OXYGEN SATURATION: 98 % | HEART RATE: 63 BPM | TEMPERATURE: 98 F | BODY MASS INDEX: 25.48 KG/M2 | SYSTOLIC BLOOD PRESSURE: 183 MMHG | WEIGHT: 178 LBS | DIASTOLIC BLOOD PRESSURE: 78 MMHG

## 2024-05-02 DIAGNOSIS — T81.89XD OTHER COMPLICATIONS OF PROCEDURES, NOT ELSEWHERE CLASSIFIED, SUBSEQUENT ENCOUNTER: ICD-10-CM

## 2024-05-02 DIAGNOSIS — E11.40 TYPE 2 DIABETES MELLITUS WITH DIABETIC NEUROPATHY, UNSPECIFIED: ICD-10-CM

## 2024-05-02 PROCEDURE — G0463: CPT

## 2024-05-02 PROCEDURE — 73630 X-RAY EXAM OF FOOT: CPT | Mod: 26,50

## 2024-05-02 PROCEDURE — 73630 X-RAY EXAM OF FOOT: CPT

## 2024-05-02 PROCEDURE — 99214 OFFICE O/P EST MOD 30 MIN: CPT

## 2024-05-02 RX ORDER — LOSARTAN POTASSIUM 100 MG/1
100 TABLET, FILM COATED ORAL
Refills: 0 | Status: ACTIVE | COMMUNITY

## 2024-05-02 RX ORDER — CEPHALEXIN 250 MG/1
250 TABLET ORAL
Qty: 14 | Refills: 0 | Status: COMPLETED | COMMUNITY
Start: 2023-12-28 | End: 2024-05-02

## 2024-05-02 RX ORDER — PREGABALIN 50 MG/1
50 CAPSULE ORAL 3 TIMES DAILY
Qty: 90 | Refills: 0 | Status: COMPLETED | COMMUNITY
Start: 2024-02-22 | End: 2024-05-02

## 2024-05-02 RX ORDER — FAMOTIDINE 20 MG/1
20 TABLET, FILM COATED ORAL
Refills: 0 | Status: ACTIVE | COMMUNITY

## 2024-05-02 RX ORDER — ATORVASTATIN CALCIUM 80 MG/1
80 TABLET, FILM COATED ORAL
Refills: 0 | Status: ACTIVE | COMMUNITY

## 2024-05-02 RX ORDER — ORAL SEMAGLUTIDE 7 MG/1
7 TABLET ORAL
Refills: 0 | Status: ACTIVE | COMMUNITY

## 2024-05-02 RX ORDER — GABAPENTIN 300 MG
300 TABLET ORAL
Refills: 0 | Status: ACTIVE | COMMUNITY

## 2024-05-02 RX ORDER — CHLORHEXIDINE GLUCONATE 4 %
325 (65 FE) LIQUID (ML) TOPICAL
Refills: 0 | Status: ACTIVE | COMMUNITY

## 2024-05-02 RX ORDER — ALBUTEROL 90 MCG
90 AEROSOL (GRAM) INHALATION
Refills: 0 | Status: ACTIVE | COMMUNITY

## 2024-05-02 RX ORDER — PREGABALIN 50 MG/1
50 CAPSULE ORAL 3 TIMES DAILY
Qty: 42 | Refills: 0 | Status: COMPLETED | COMMUNITY
Start: 2024-01-25 | End: 2024-05-02

## 2024-05-02 RX ORDER — TIOTROPIUM BROMIDE 18 UG/1
18 CAPSULE ORAL; RESPIRATORY (INHALATION)
Refills: 0 | Status: ACTIVE | COMMUNITY

## 2024-05-02 RX ORDER — METFORMIN HYDROCHLORIDE 1000 MG/1
1000 TABLET, COATED ORAL TWICE DAILY
Refills: 0 | Status: ACTIVE | COMMUNITY

## 2024-05-02 RX ORDER — PREGABALIN 50 MG/1
50 CAPSULE ORAL TWICE DAILY
Qty: 60 | Refills: 2 | Status: ACTIVE | COMMUNITY
Start: 2024-04-17

## 2024-05-02 RX ORDER — OMEPRAZOLE 20 MG/1
20 CAPSULE, DELAYED RELEASE ORAL
Refills: 0 | Status: ACTIVE | COMMUNITY

## 2024-05-02 RX ORDER — EMPAGLIFLOZIN 25 MG/1
25 TABLET, FILM COATED ORAL
Refills: 0 | Status: ACTIVE | COMMUNITY

## 2024-05-02 RX ORDER — METOPROLOL SUCCINATE 25 MG/1
25 TABLET, EXTENDED RELEASE ORAL
Refills: 0 | Status: ACTIVE | COMMUNITY

## 2024-05-02 RX ORDER — MONTELUKAST 10 MG/1
10 TABLET, FILM COATED ORAL
Refills: 0 | Status: ACTIVE | COMMUNITY

## 2024-05-02 RX ORDER — AZELASTINE HYDROCHLORIDE 137 UG/1
137 SPRAY, METERED NASAL
Refills: 0 | Status: ACTIVE | COMMUNITY

## 2024-05-02 RX ORDER — GLIMEPIRIDE 4 MG/1
4 TABLET ORAL
Refills: 0 | Status: ACTIVE | COMMUNITY

## 2024-05-03 PROBLEM — T81.89XD DRAINING POSTOPERATIVE WOUND, SUBSEQUENT ENCOUNTER: Status: ACTIVE | Noted: 2023-11-24

## 2024-05-03 PROBLEM — E11.40 CHRONIC PAINFUL DIABETIC NEUROPATHY: Status: ACTIVE | Noted: 2023-10-26

## 2024-05-03 NOTE — ASSESSMENT
[Verbal] : Verbal [Written] : Written [Demo] : Demo [Patient] : Patient [Spouse] : Spouse [Good - alert, interested, motivated] : Good - alert, interested, motivated [Verbalizes knowledge/Understanding] : Verbalizes knowledge/understanding [Dressing changes] : dressing changes [Foot Care] : foot care [Skin Care] : skin care [Signs and symptoms of infection] : sign and symptoms of infection [Nutrition] : nutrition [How and When to Call] : how and when to call [Patient responsibility to plan of care] : patient responsibility to plan of care [Glycemic Control] : glycemic control [Stable] : stable [Home] : Home [Ambulatory] : Ambulatory [Not Applicable - Long Term Care/Home Health Agency] : Long Term Care/Home Health Agency: Not Applicable [] : No [FreeTextEntry2] : Infection Prevention Wound care and Dressing changes Promote and maintain optimal skin integrity Nutrition and Wound Healing  Protect and Guard wound site Increasing ROM/physical therapy [FreeTextEntry4] : DPM reviewed x-ray of bilateral feet with patient, Patient with high arches and arthritic changes in the bridge of the foot. Advised the patient to purchase rocker bottom shoes to help support the heel and forefront of the foot. Patient to get custom molded inserts as well. Follow up in 1 month

## 2024-05-03 NOTE — HISTORY OF PRESENT ILLNESS
[FreeTextEntry1] : Patient presents with a chief complaint of pain to his right heel.  Patient presents today with his wife and daughter.  Patient relates that he was initially seen by his outside podiatrist who obtained radiographs a few months prior and noted a bone lesion of his right heel bone. Patient was advised regarding surgical intervention at this time and relates that he went to see an orthopedic surgeon (Dr. Trivedi) who after obtaining an MRI, advised regarding monitoring for any changes. Pt relates that in the past week, he has noticed pain to his right heel and states that the pain is starting to radiate to the front of his foot. Pt relates that he is putting more pressure on his left foot to compensate which is causing pain. Of note is that patient is a type 2 diabetic with a recent hemoglobin a1c level which patient's family reports to be 9.7 earlier this month. Patient relates that he has recently changed his diabetic medication regimen which has lowered his recent blood sugar levels.  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  Patient relates that he has been walking in regular sneakers since the last encounter.  Patient relates that he has started physical therapy, complains of muscle soreness to lower extremity after starting physical therapy. 5/2/24 patient seen for bilateral foot pain and describes the pain to be "stinging and stabbing" to both of his feet.  Patient also points to his right plantar metatarsal heads and digits, dorsal midfoot, and right lateral ankle and relates that he has pain with weightbearing, patient describes the pain to be aching and throbbing.  Patient relates that the pain to his left foot is less.  Patient relates that the Lyrica/pregabalin has helped with the stinging and stabbing sensation.

## 2024-05-03 NOTE — PLAN
[FreeTextEntry1] : Patient examined and evaluated this time.  Patient advised regarding possible etiology of his symptoms.  Patient will benefit from custom orthotics with arch supports and a plantar fascial cut out.  Patient also advised regarding rocker-bottom shoes to help aid in his ambulation.  Patient advised regarding an adjustment of Lyrica dosage.  Spent 30 minutes on patient care and medical decision making.

## 2024-05-03 NOTE — PHYSICAL EXAM
[2+] : left 2+ [Alert] : alert [Oriented to Person] : oriented to person [Oriented to Place] : oriented to place [Calm] : calm [Ankle Swelling (On Exam)] : not present [Varicose Veins Of Lower Extremities] : not present [] : not present [Purpura] : no purpura  [Petechiae] : no petechiae [Skin Ulcer] : no ulcer [de-identified] : A&Ox3, NAD [de-identified] : HTN, HLD [de-identified] : Bilateral high arch deformities noted.  Bilateral radiographs show bilateral heel spurs with cavus deformities. [de-identified] : No erythema, no ecchymosis, no open lesions, no fluctuance, no proximal streaking [de-identified] : Light touch sensation intact bilaterally [FreeTextEntry1] : Right Foot Pain [FreeTextEntry7] : Left Foot Pain

## 2024-05-03 NOTE — REVIEW OF SYSTEMS
[Negative] : Heme/Lymph [Fever] : no fever [Chills] : no chills [Eye Pain] : no eye pain [Loss Of Hearing] : no hearing loss [Shortness Of Breath] : no shortness of breath [Wheezing] : no wheezing [Abdominal Pain] : no abdominal pain [Skin Lesions] : no skin lesions [Skin Wound] : no skin wound [Anxiety] : no anxiety [Easy Bleeding] : no tendency for easy bleeding [FreeTextEntry5] : HTN, HLD [FreeTextEntry9] : right foot pain [de-identified] : IDDM with neuropathy [de-identified] : CAMILO

## 2024-05-04 DIAGNOSIS — Z91.048 OTHER NONMEDICINAL SUBSTANCE ALLERGY STATUS: ICD-10-CM

## 2024-05-04 DIAGNOSIS — I10 ESSENTIAL (PRIMARY) HYPERTENSION: ICD-10-CM

## 2024-05-04 DIAGNOSIS — T81.89XD OTHER COMPLICATIONS OF PROCEDURES, NOT ELSEWHERE CLASSIFIED, SUBSEQUENT ENCOUNTER: ICD-10-CM

## 2024-05-04 DIAGNOSIS — Z79.84 LONG TERM (CURRENT) USE OF ORAL HYPOGLYCEMIC DRUGS: ICD-10-CM
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
## 2024-05-04 DIAGNOSIS — Z88.1 ALLERGY STATUS TO OTHER ANTIBIOTIC AGENTS STATUS: ICD-10-CM
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
## 2024-05-04 DIAGNOSIS — M19.90 UNSPECIFIED OSTEOARTHRITIS, UNSPECIFIED SITE: ICD-10-CM

## 2024-05-04 DIAGNOSIS — Z83.3 FAMILY HISTORY OF DIABETES MELLITUS: ICD-10-CM

## 2024-05-04 DIAGNOSIS — E11.40 TYPE 2 DIABETES MELLITUS WITH DIABETIC NEUROPATHY, UNSPECIFIED: ICD-10-CM

## 2024-05-04 DIAGNOSIS — Z87.891 PERSONAL HISTORY OF NICOTINE DEPENDENCE: ICD-10-CM

## 2024-05-04 DIAGNOSIS — Z86.16 PERSONAL HISTORY OF COVID-19: ICD-10-CM

## 2024-05-04 DIAGNOSIS — E78.00 PURE HYPERCHOLESTEROLEMIA, UNSPECIFIED: ICD-10-CM

## 2024-05-16 RX ORDER — PREGABALIN 50 MG/1
50 CAPSULE ORAL 3 TIMES DAILY
Qty: 90 | Refills: 0 | Status: ACTIVE | COMMUNITY
Start: 2024-05-16 | End: 1900-01-01

## 2024-12-09 ENCOUNTER — APPOINTMENT (OUTPATIENT)
Facility: CLINIC | Age: 69
End: 2024-12-09
Payer: MEDICARE

## 2024-12-09 VITALS — BODY MASS INDEX: 25.48 KG/M2 | WEIGHT: 178 LBS | HEIGHT: 70 IN

## 2024-12-09 DIAGNOSIS — M87.052 IDIOPATHIC ASEPTIC NECROSIS OF LEFT FEMUR: ICD-10-CM

## 2024-12-09 DIAGNOSIS — M87.051 IDIOPATHIC ASEPTIC NECROSIS OF RIGHT FEMUR: ICD-10-CM

## 2024-12-09 PROCEDURE — 73502 X-RAY EXAM HIP UNI 2-3 VIEWS: CPT

## 2024-12-09 PROCEDURE — 99213 OFFICE O/P EST LOW 20 MIN: CPT

## 2024-12-09 RX ORDER — ROSUVASTATIN CALCIUM 20 MG/1
20 TABLET, FILM COATED ORAL
Refills: 0 | Status: ACTIVE | COMMUNITY

## 2025-05-20 ENCOUNTER — APPOINTMENT (OUTPATIENT)
Dept: VASCULAR SURGERY | Facility: CLINIC | Age: 70
End: 2025-05-20
Payer: MEDICARE

## 2025-05-20 VITALS
DIASTOLIC BLOOD PRESSURE: 78 MMHG | SYSTOLIC BLOOD PRESSURE: 186 MMHG | HEART RATE: 74 BPM | OXYGEN SATURATION: 97 % | TEMPERATURE: 97.7 F | RESPIRATION RATE: 14 BRPM | BODY MASS INDEX: 25.59 KG/M2 | HEIGHT: 67.5 IN | WEIGHT: 165 LBS

## 2025-05-20 DIAGNOSIS — M79.671 PAIN IN RIGHT FOOT: ICD-10-CM

## 2025-05-20 DIAGNOSIS — M79.672 PAIN IN RIGHT FOOT: ICD-10-CM

## 2025-05-20 DIAGNOSIS — Z00.00 ENCOUNTER FOR GENERAL ADULT MEDICAL EXAMINATION W/OUT ABNORMAL FINDINGS: ICD-10-CM

## 2025-05-20 DIAGNOSIS — I73.9 PERIPHERAL VASCULAR DISEASE, UNSPECIFIED: ICD-10-CM

## 2025-05-20 PROCEDURE — 99203 OFFICE O/P NEW LOW 30 MIN: CPT

## (undated) DEVICE — SAW BLADE LINVATEC SAGITTAL MIC 9.5X25.5X0.4MM

## (undated) DEVICE — SOL IRR POUR NS 0.9% 1000ML

## (undated) DEVICE — GLV 7.5 PROTEXIS (WHITE)

## (undated) DEVICE — DRAPE 3/4 SHEET W REINFORCEMENT 56X77"

## (undated) DEVICE — DRSG CAST FIBERGLASS 4"

## (undated) DEVICE — WARMING BLANKET UPPER ADULT

## (undated) DEVICE — SUT POLYSORB 2-0 30" V-20 UNDYED

## (undated) DEVICE — PLV-SCD MACHINE: Type: DURABLE MEDICAL EQUIPMENT

## (undated) DEVICE — TOURNIQUET CUFF 18" DUAL PORT SINGLE BLADDER LUER LOCK (BLACK)

## (undated) DEVICE — VENODYNE/SCD SLEEVE CALF MEDIUM

## (undated) DEVICE — SUT POLYSORB 3-0 30" V-20 UNDYED

## (undated) DEVICE — DRSG TELFA 3 X 8

## (undated) DEVICE — PLV/PSP-TOURNIQUET #2 3006KAAL: Type: DURABLE MEDICAL EQUIPMENT

## (undated) DEVICE — SUT POLYSORB 5-0 18" P-12 UNDYED

## (undated) DEVICE — SAW BLADE RASP CONMED LINVATEC MICRO 100 OSCILLATING 5.5 X 13 X 0.4MM

## (undated) DEVICE — SUT MONOSOF 4-0 18" P-12

## (undated) DEVICE — DRSG KLING 6"

## (undated) DEVICE — PACK LOWER EXTREMITY NS PLAINVI

## (undated) DEVICE — VENODYNE/SCD SLEEVE CALF LARGE

## (undated) DEVICE — BUR MICROAIRE CARBIDE OVAL 4MM 8 FLUTE

## (undated) DEVICE — PLV/PSP-ESU FORCEFX F8I7418A: Type: DURABLE MEDICAL EQUIPMENT

## (undated) DEVICE — DRSG KLING 3"

## (undated) DEVICE — DRSG WEBRIL 3"

## (undated) DEVICE — DRSG KERLIX ROLL 4.5"

## (undated) DEVICE — DRSG WEBRIL 6"

## (undated) DEVICE — SUT POLYSORB 4-0 30" V-20 UNDYED

## (undated) DEVICE — DRSG ADAPTIC 3 X 8"

## (undated) DEVICE — DRSG CAST PLASTER 6" (BLUE)